# Patient Record
Sex: MALE | Race: WHITE | NOT HISPANIC OR LATINO | Employment: FULL TIME | URBAN - METROPOLITAN AREA
[De-identification: names, ages, dates, MRNs, and addresses within clinical notes are randomized per-mention and may not be internally consistent; named-entity substitution may affect disease eponyms.]

---

## 2020-06-19 ENCOUNTER — OFFICE VISIT (OUTPATIENT)
Dept: FAMILY MEDICINE CLINIC | Facility: CLINIC | Age: 55
End: 2020-06-19

## 2020-06-19 VITALS
HEIGHT: 70 IN | BODY MASS INDEX: 34.79 KG/M2 | SYSTOLIC BLOOD PRESSURE: 134 MMHG | HEART RATE: 66 BPM | TEMPERATURE: 97.8 F | WEIGHT: 243 LBS | DIASTOLIC BLOOD PRESSURE: 74 MMHG | OXYGEN SATURATION: 98 %

## 2020-06-19 DIAGNOSIS — Z12.5 SCREENING FOR PROSTATE CANCER: ICD-10-CM

## 2020-06-19 DIAGNOSIS — Z80.42 FAMILY HISTORY OF PROSTATE CANCER: ICD-10-CM

## 2020-06-19 DIAGNOSIS — S22.42XD CLOSED FRACTURE OF MULTIPLE RIBS OF LEFT SIDE WITH ROUTINE HEALING: ICD-10-CM

## 2020-06-19 DIAGNOSIS — E78.2 MIXED HYPERLIPIDEMIA: Primary | ICD-10-CM

## 2020-06-19 DIAGNOSIS — Z12.11 COLON CANCER SCREENING: ICD-10-CM

## 2020-06-19 PROCEDURE — 99214 OFFICE O/P EST MOD 30 MIN: CPT | Performed by: FAMILY MEDICINE

## 2020-06-19 RX ORDER — PROMETHAZINE HYDROCHLORIDE 25 MG/1
25 TABLET ORAL
COMMUNITY
Start: 2020-06-15

## 2020-06-19 RX ORDER — OXYCODONE AND ACETAMINOPHEN 10; 325 MG/1; MG/1
1 TABLET ORAL
COMMUNITY
Start: 2020-06-15

## 2020-06-19 RX ORDER — ATORVASTATIN CALCIUM 40 MG/1
20 TABLET, FILM COATED ORAL DAILY
COMMUNITY
End: 2020-06-19 | Stop reason: SDUPTHER

## 2020-06-19 RX ORDER — ONDANSETRON 4 MG/1
4 TABLET, FILM COATED ORAL
COMMUNITY
Start: 2020-06-15

## 2020-06-19 RX ORDER — ATORVASTATIN CALCIUM 20 MG/1
20 TABLET, FILM COATED ORAL DAILY
Qty: 90 TABLET | Refills: 3 | Status: SHIPPED | OUTPATIENT
Start: 2020-06-19

## 2020-06-19 NOTE — PROGRESS NOTES
"Subjective   Johnathan Olsen is a 55 y.o. male.     Chief Complaint   Patient presents with   • Rib Injury       History of Present Illness   Patient fell off a ladder a week ago.  Went to the ER and was told he had broken some ribs.  Is here to follow-up.  Also here to reestablish care.  Using incentive spirometer at home.  Pain is slowly improving.    hld- doing well on meds, due labs    FH prostate ca., no symptoms    Never had c-scope    The following portions of the patient's history were reviewed and updated as appropriate: allergies, current medications, past family history, past medical history, past social history, past surgical history and problem list.    History reviewed. No pertinent past medical history.    Past Surgical History:   Procedure Laterality Date   • TONSILLECTOMY         Family History   Problem Relation Age of Onset   • Other Mother         ms   • Heart disease Mother    • Hyperlipidemia Mother    • Cancer Father         lung, prostate       Social History     Socioeconomic History   • Marital status:      Spouse name: Not on file   • Number of children: Not on file   • Years of education: Not on file   • Highest education level: Not on file   Tobacco Use   • Smoking status: Former Smoker   • Smokeless tobacco: Never Used   Substance and Sexual Activity   • Alcohol use: Yes     Frequency: Monthly or less   • Drug use: Never       Review of Systems   Respiratory: Negative for shortness of breath.    Cardiovascular: Negative for chest pain.       Objective   Visit Vitals  /74 (BP Location: Right arm, Patient Position: Sitting)   Pulse 66   Temp 97.8 °F (36.6 °C)   Ht 177.8 cm (70\")   Wt 110 kg (243 lb)   SpO2 98%   BMI 34.87 kg/m²     Body mass index is 34.87 kg/m².  Physical Exam   Constitutional: He is oriented to person, place, and time. He appears well-developed and well-nourished.   Cardiovascular: Normal rate, regular rhythm, normal heart sounds and intact distal pulses. "   Pulmonary/Chest: Effort normal and breath sounds normal.   Musculoskeletal: Normal range of motion. He exhibits no edema.   Neurological: He is alert and oriented to person, place, and time.   Skin: Skin is warm and dry.   Psychiatric: He has a normal mood and affect. His behavior is normal.         Assessment/Plan   Johnathan was seen today for rib injury.    Diagnoses and all orders for this visit:    Mixed hyperlipidemia  -     atorvastatin (LIPITOR) 20 MG tablet; Take 1 tablet by mouth Daily.  -     Comprehensive Metabolic Panel  -     Lipid Panel  -     PSA Screen    Family history of prostate cancer  -     PSA Screen    Colon cancer screening  -     PSA Screen  -     Ambulatory Referral For Screening Colonoscopy    Closed fracture of multiple ribs of left side with routine healing  -     PSA Screen    Screening for prostate cancer  -     PSA Screen        F/U as needed and yearly. Cont meds.

## 2020-06-20 LAB
ALBUMIN SERPL-MCNC: 4.4 G/DL (ref 3.5–5.2)
ALBUMIN/GLOB SERPL: 1.7 G/DL
ALP SERPL-CCNC: 56 U/L (ref 39–117)
ALT SERPL-CCNC: 24 U/L (ref 1–41)
AST SERPL-CCNC: 18 U/L (ref 1–40)
BILIRUB SERPL-MCNC: 0.4 MG/DL (ref 0.2–1.2)
BUN SERPL-MCNC: 13 MG/DL (ref 6–20)
BUN/CREAT SERPL: 11.8 (ref 7–25)
CALCIUM SERPL-MCNC: 9.5 MG/DL (ref 8.6–10.5)
CHLORIDE SERPL-SCNC: 103 MMOL/L (ref 98–107)
CHOLEST SERPL-MCNC: 157 MG/DL (ref 0–200)
CO2 SERPL-SCNC: 24.9 MMOL/L (ref 22–29)
CREAT SERPL-MCNC: 1.1 MG/DL (ref 0.76–1.27)
GLOBULIN SER CALC-MCNC: 2.6 GM/DL
GLUCOSE SERPL-MCNC: 92 MG/DL (ref 65–99)
HDLC SERPL-MCNC: 39 MG/DL (ref 40–60)
LDLC SERPL CALC-MCNC: 95 MG/DL (ref 0–100)
POTASSIUM SERPL-SCNC: 4.7 MMOL/L (ref 3.5–5.2)
PROT SERPL-MCNC: 7 G/DL (ref 6–8.5)
PSA SERPL-MCNC: 0.27 NG/ML (ref 0–4)
SODIUM SERPL-SCNC: 140 MMOL/L (ref 136–145)
TRIGL SERPL-MCNC: 116 MG/DL (ref 0–150)
VLDLC SERPL CALC-MCNC: 23.2 MG/DL

## 2021-06-23 DIAGNOSIS — E78.2 MIXED HYPERLIPIDEMIA: ICD-10-CM

## 2021-06-23 RX ORDER — ATORVASTATIN CALCIUM 20 MG/1
TABLET, FILM COATED ORAL
Qty: 90 TABLET | Refills: 3 | OUTPATIENT
Start: 2021-06-23

## 2024-02-22 ENCOUNTER — OFFICE VISIT (OUTPATIENT)
Dept: FAMILY MEDICINE CLINIC | Facility: CLINIC | Age: 59
End: 2024-02-22
Payer: COMMERCIAL

## 2024-02-22 VITALS
RESPIRATION RATE: 17 BRPM | HEIGHT: 70 IN | SYSTOLIC BLOOD PRESSURE: 124 MMHG | WEIGHT: 258 LBS | HEART RATE: 60 BPM | OXYGEN SATURATION: 97 % | BODY MASS INDEX: 36.94 KG/M2 | TEMPERATURE: 97.8 F | DIASTOLIC BLOOD PRESSURE: 82 MMHG

## 2024-02-22 DIAGNOSIS — Z12.11 COLON CANCER SCREENING: ICD-10-CM

## 2024-02-22 DIAGNOSIS — E78.2 MIXED HYPERLIPIDEMIA: ICD-10-CM

## 2024-02-22 DIAGNOSIS — Z11.59 ENCOUNTER FOR HEPATITIS C SCREENING TEST FOR LOW RISK PATIENT: ICD-10-CM

## 2024-02-22 DIAGNOSIS — Z28.39 IMMUNIZATION DEFICIENCY: ICD-10-CM

## 2024-02-22 DIAGNOSIS — R35.1 BENIGN PROSTATIC HYPERPLASIA WITH NOCTURIA: ICD-10-CM

## 2024-02-22 DIAGNOSIS — Z00.00 ENCOUNTER FOR ANNUAL PHYSICAL EXAM: Primary | ICD-10-CM

## 2024-02-22 DIAGNOSIS — I20.89 EXERTIONAL ANGINA: ICD-10-CM

## 2024-02-22 DIAGNOSIS — N40.1 BENIGN PROSTATIC HYPERPLASIA WITH NOCTURIA: ICD-10-CM

## 2024-02-22 RX ORDER — ATORVASTATIN CALCIUM 40 MG/1
40 TABLET, FILM COATED ORAL DAILY
Qty: 90 TABLET | Refills: 3 | Status: SHIPPED | OUTPATIENT
Start: 2024-02-22

## 2024-02-22 RX ORDER — ASPIRIN 81 MG/1
162 TABLET ORAL DAILY
Start: 2024-02-22

## 2024-02-22 RX ORDER — ATORVASTATIN CALCIUM 20 MG/1
20 TABLET, FILM COATED ORAL DAILY
Qty: 90 TABLET | Refills: 3 | Status: SHIPPED | OUTPATIENT
Start: 2024-02-22 | End: 2024-02-22 | Stop reason: SDUPTHER

## 2024-02-22 RX ORDER — NITROGLYCERIN 0.4 MG/1
0.4 TABLET SUBLINGUAL
Qty: 25 TABLET | Refills: 12 | Status: SHIPPED | OUTPATIENT
Start: 2024-02-22

## 2024-02-22 NOTE — PROGRESS NOTES
Patient here for annual physical exam    Subjective   Johnathan Olsen is a 59 y.o. male.     History of Present Illness   60 yo WM here for annual.    The following portions of the patient's history were reviewed and updated as appropriate: allergies, current medications, past family history, past medical history, past social history, past surgical history and problem list    Cologuard needed.    Optometry:utd  Dentist: utd.    Last PSA(if applicable):needed.  Last mammo(if applicable):na    C/o chest discomfort.  C/o episode of pain in chest.  Was picking up sticks and discomfort in chest.  Another time thinks he was taking garbage out and had pain in chest and went up into jaw and cheeks.  Usually lasts 5-10 minutes then goes away.  No help with antacids.  Mom with angioplasty in 40s.  No exercise.  Past smoker quit 2009 and smoked 25 years.  Can sometimes walk to bathroom at work and will get chest pain.      Immunization History   Administered Date(s) Administered    COVID-19 (PFIZER) Purple Cap Monovalent 03/24/2021, 04/21/2021, 12/27/2021    Fluzone (or Fluarix & Flulaval for VFC) >6mos 12/27/2021    Tdap 02/22/2024       Review of Systems   Constitutional:  Negative for appetite change and fatigue.   HENT:  Negative for nosebleeds and sore throat.    Eyes:  Negative for blurred vision and visual disturbance.   Respiratory:  Negative for shortness of breath and wheezing.    Cardiovascular:  Positive for chest pain. Negative for leg swelling.   Gastrointestinal:  Negative for abdominal distention and abdominal pain.   Endocrine: Negative for cold intolerance and polyuria.   Genitourinary:  Negative for dysuria and hematuria.   Musculoskeletal:  Negative for arthralgias and myalgias.   Skin:  Negative for color change and rash.   Neurological:  Negative for weakness and confusion.   Psychiatric/Behavioral:  Negative for agitation and depressed mood.        Patient Active Problem List   Diagnosis    APC (atrial  premature contractions)    Hyperlipidemia    RBBB (right bundle branch block)    Family history of prostate cancer    Colon cancer screening    Closed fracture of multiple ribs of left side with routine healing    Screening for prostate cancer    Encounter for hepatitis C screening test for low risk patient    Encounter for annual physical exam    Benign prostatic hyperplasia with nocturia    Exertional angina       No Known Allergies      Current Outpatient Medications:     atorvastatin (LIPITOR) 40 MG tablet, Take 1 tablet by mouth Daily., Disp: 90 tablet, Rfl: 3    aspirin 81 MG EC tablet, Take 2 tablets by mouth Daily., Disp: , Rfl:     nitroglycerin (NITROSTAT) 0.4 MG SL tablet, Place 1 tablet under the tongue Every 5 (Five) Minutes As Needed for Chest Pain. Take no more than 3 doses in 15 minutes., Disp: 25 tablet, Rfl: 12    Past Medical History:   Diagnosis Date    Hyperlipidemia        Past Surgical History:   Procedure Laterality Date    TONSILLECTOMY         Family History   Problem Relation Age of Onset    Other Mother         ms    Heart disease Mother     Hyperlipidemia Mother     Cancer Father         lung, prostate       Social History     Tobacco Use    Smoking status: Former     Packs/day: 1.00     Years: 25.00     Additional pack years: 0.00     Total pack years: 25.00     Types: Cigarettes     Quit date: 2/11/2009     Years since quitting: 15.0    Smokeless tobacco: Never   Substance Use Topics    Alcohol use: Yes     Comment: socially            Objective     Vitals:    02/22/24 1247   BP: 124/82   Pulse: 60   Resp: 17   Temp: 97.8 °F (36.6 °C)   SpO2: 97%     Body mass index is 37.02 kg/m².    Physical Exam  Vitals reviewed.   Constitutional:       Appearance: He is well-developed. He is not diaphoretic.   HENT:      Head: Normocephalic and atraumatic.   Eyes:      General: No scleral icterus.     Pupils: Pupils are equal, round, and reactive to light.   Neck:      Thyroid: No thyromegaly.    Cardiovascular:      Rate and Rhythm: Normal rate and regular rhythm.      Heart sounds: No murmur heard.     No friction rub. No gallop.   Pulmonary:      Effort: Pulmonary effort is normal. No respiratory distress.      Breath sounds: No wheezing or rales.   Chest:      Chest wall: No tenderness.   Abdominal:      General: Bowel sounds are normal. There is no distension.      Palpations: Abdomen is soft.      Tenderness: There is no abdominal tenderness.   Musculoskeletal:         General: No deformity. Normal range of motion.   Lymphadenopathy:      Cervical: No cervical adenopathy.   Skin:     General: Skin is warm and dry.      Findings: No rash.   Neurological:      Cranial Nerves: No cranial nerve deficit.      Motor: No abnormal muscle tone.         Lab Results   Component Value Date    GLUCOSE 92 06/19/2020    BUN 13 06/19/2020    CREATININE 1.10 06/19/2020    EGFRIFNONA 69 06/19/2020    EGFRIFAFRI 84 06/19/2020    BCR 11.8 06/19/2020    K 4.7 06/19/2020    CO2 24.9 06/19/2020    CALCIUM 9.5 06/19/2020    PROTENTOTREF 7.0 06/19/2020    ALBUMIN 4.40 06/19/2020    LABIL2 1.7 06/19/2020    AST 18 06/19/2020    ALT 24 06/19/2020       WBC   Date Value Ref Range Status   06/15/2020 7.70 4.5 - 11.0 10*3/uL Final     RBC   Date Value Ref Range Status   06/15/2020 5.00 4.5 - 5.9 10*6/uL Final     Hemoglobin   Date Value Ref Range Status   06/15/2020 14.7 13.5 - 17.5 g/dL Final     Hematocrit   Date Value Ref Range Status   06/15/2020 44.9 41.0 - 53.0 % Final     MCV   Date Value Ref Range Status   06/15/2020 89.8 80.0 - 100.0 fL Final     MCH   Date Value Ref Range Status   06/15/2020 29.4 26.0 - 34.0 pg Final     MCHC   Date Value Ref Range Status   06/15/2020 32.7 31.0 - 37.0 g/dL Final     RDW   Date Value Ref Range Status   06/15/2020 15.5 12.0 - 16.8 % Final     MPV   Date Value Ref Range Status   06/15/2020 10.6 6.7 - 10.8 fL Final     Platelets   Date Value Ref Range Status   06/15/2020 186 140 - 440  "10*3/uL Final     Neutrophil Rel %   Date Value Ref Range Status   06/15/2020 64.5 45 - 80 % Final     Lymphocyte Rel %   Date Value Ref Range Status   06/15/2020 21.2 15 - 50 % Final     Monocyte Rel %   Date Value Ref Range Status   06/15/2020 10.1 0 - 15 % Final     Eosinophil %   Date Value Ref Range Status   06/15/2020 3.4 0 - 7 % Final     Basophil Rel %   Date Value Ref Range Status   06/15/2020 0.5 0 - 2 % Final     Immature Grans %   Date Value Ref Range Status   06/15/2020 0.3 (H) 0 % Final     Neutrophils Absolute   Date Value Ref Range Status   06/15/2020 4.97 2.0 - 8.8 10*3/uL Final     Lymphocytes Absolute   Date Value Ref Range Status   06/15/2020 1.63 0.7 - 5.5 10*3/uL Final     Monocytes Absolute   Date Value Ref Range Status   06/15/2020 0.78 0.0 - 1.7 10*3/uL Final     Eosinophils Absolute   Date Value Ref Range Status   06/15/2020 0.26 0.0 - 0.8 10*3/uL Final     Basophils Absolute   Date Value Ref Range Status   06/15/2020 0.04 0.0 - 0.2 10*3/uL Final     Immature Grans, Absolute   Date Value Ref Range Status   06/15/2020 0.02 <1 10*3/uL Final     nRBC   Date Value Ref Range Status   06/15/2020 0 0 /100(WBC) Final       No results found for: \"HGBA1C\"    No results found for: \"REDASTXI86\"    No results found for: \"TSH\"    No results found for: \"CHOL\"  Lab Results   Component Value Date    TRIG 116 06/19/2020     Lab Results   Component Value Date    HDL 39 (L) 06/19/2020     Lab Results   Component Value Date    LDL 95 06/19/2020     Lab Results   Component Value Date    VLDL 23.2 06/19/2020     No results found for: \"LDLHDL\"      Procedures  EKG with NSR no ST changes no q waves.  No comparisons.  EKG performed for Chest pain.      Assessment & Plan   Problems Addressed this Visit       Hyperlipidemia    Relevant Medications    atorvastatin (LIPITOR) 40 MG tablet    Other Relevant Orders    Comprehensive Metabolic Panel    Lipid Panel With / Chol / HDL Ratio    Colon cancer screening    Relevant " Orders    Cologuard - Stool, Per Rectum    Encounter for hepatitis C screening test for low risk patient    Relevant Orders    Hepatitis C Antibody    Encounter for annual physical exam - Primary    Benign prostatic hyperplasia with nocturia    Relevant Orders    PSA DIAGNOSTIC    Exertional angina    Relevant Medications    nitroglycerin (NITROSTAT) 0.4 MG SL tablet    Other Relevant Orders    CBC & Differential    ECG 12 Lead    Ambulatory Referral to Cardiology     Other Visit Diagnoses       Immunization deficiency        Relevant Orders    Tdap Vaccine Greater Than or Equal To 8yo IM (Completed)          Diagnoses         Codes Comments    Encounter for annual physical exam    -  Primary ICD-10-CM: Z00.00  ICD-9-CM: V70.0     Colon cancer screening     ICD-10-CM: Z12.11  ICD-9-CM: V76.51     Benign prostatic hyperplasia with nocturia     ICD-10-CM: N40.1, R35.1  ICD-9-CM: 600.01, 788.43     Mixed hyperlipidemia     ICD-10-CM: E78.2  ICD-9-CM: 272.2     Exertional angina     ICD-10-CM: I20.89  ICD-9-CM: 413.9     Encounter for hepatitis C screening test for low risk patient     ICD-10-CM: Z11.59  ICD-9-CM: V73.89     Immunization deficiency     ICD-10-CM: Z28.39  ICD-9-CM: V15.83         Exertional angina.  Multiple risk factors for CAD(past smoker, FH, obesity, hyperlipidmeia).  Stay on aspirin  but increase to 162 mg daily.  Increase atorvastatin to 40 a day. Start nitro.  Stat cards appt as will need cath.       Preventive Counseling:  Encouraged to stay active.   Pneumovax UTD.  Cologuard ordered. Tdap today.    Dentist UTD.  Optho UTD.      Orders Placed This Encounter   Procedures    Tdap Vaccine Greater Than or Equal To 8yo IM    Cologuard - Stool, Per Rectum     Standing Status:   Future     Standing Expiration Date:   2/22/2025     Order Specific Question:   Release to patient     Answer:   Routine Release [[id number]]    Comprehensive Metabolic Panel     Order Specific Question:   Release to patient      Answer:   Routine Release [[id number]]    Lipid Panel With / Chol / HDL Ratio     Order Specific Question:   Release to patient     Answer:   Routine Release [[id number]]    Hepatitis C Antibody     Order Specific Question:   Release to patient     Answer:   Routine Release [[id number]]    PSA DIAGNOSTIC     Order Specific Question:   Release to patient     Answer:   Routine Release [[id number]]    Ambulatory Referral to Cardiology     Referral Priority:   Urgent     Referral Type:   Consultation     Referral Reason:   Specialty Services Required     Requested Specialty:   Cardiology     Number of Visits Requested:   1    ECG 12 Lead     Order Specific Question:   Reason for Exam:     Answer:   chest pain     Order Specific Question:   Release to patient     Answer:   Routine Release [[id number]]    SCANNED EKG    CBC & Differential     Order Specific Question:   Manual Differential     Answer:   No     Order Specific Question:   Release to patient     Answer:   Routine Release [[id number]]       Current Outpatient Medications   Medication Sig Dispense Refill    atorvastatin (LIPITOR) 40 MG tablet Take 1 tablet by mouth Daily. 90 tablet 3    aspirin 81 MG EC tablet Take 2 tablets by mouth Daily.      nitroglycerin (NITROSTAT) 0.4 MG SL tablet Place 1 tablet under the tongue Every 5 (Five) Minutes As Needed for Chest Pain. Take no more than 3 doses in 15 minutes. 25 tablet 12     No current facility-administered medications for this visit.       Return in about 6 weeks (around 4/4/2024).    There are no Patient Instructions on file for this visit.

## 2024-02-23 ENCOUNTER — OFFICE VISIT (OUTPATIENT)
Age: 59
End: 2024-02-23
Payer: COMMERCIAL

## 2024-02-23 VITALS
WEIGHT: 255.2 LBS | SYSTOLIC BLOOD PRESSURE: 138 MMHG | OXYGEN SATURATION: 92 % | BODY MASS INDEX: 36.54 KG/M2 | DIASTOLIC BLOOD PRESSURE: 80 MMHG | HEART RATE: 71 BPM | HEIGHT: 70 IN

## 2024-02-23 DIAGNOSIS — E78.2 MIXED HYPERLIPIDEMIA: ICD-10-CM

## 2024-02-23 DIAGNOSIS — I49.1 APC (ATRIAL PREMATURE CONTRACTIONS): ICD-10-CM

## 2024-02-23 DIAGNOSIS — I45.10 RBBB (RIGHT BUNDLE BRANCH BLOCK): ICD-10-CM

## 2024-02-23 DIAGNOSIS — R07.9 CHEST PAIN, UNSPECIFIED TYPE: Primary | ICD-10-CM

## 2024-02-23 LAB
ALBUMIN SERPL-MCNC: 4.5 G/DL (ref 3.8–4.9)
ALBUMIN/GLOB SERPL: 1.7 {RATIO} (ref 1.2–2.2)
ALP SERPL-CCNC: 71 IU/L (ref 44–121)
ALT SERPL-CCNC: 60 IU/L (ref 0–44)
AST SERPL-CCNC: 32 IU/L (ref 0–40)
BASOPHILS # BLD AUTO: 0.1 X10E3/UL (ref 0–0.2)
BASOPHILS NFR BLD AUTO: 1 %
BILIRUB SERPL-MCNC: 0.6 MG/DL (ref 0–1.2)
BUN SERPL-MCNC: 13 MG/DL (ref 6–24)
BUN/CREAT SERPL: 11 (ref 9–20)
CALCIUM SERPL-MCNC: 9.6 MG/DL (ref 8.7–10.2)
CHLORIDE SERPL-SCNC: 103 MMOL/L (ref 96–106)
CHOLEST SERPL-MCNC: 155 MG/DL (ref 100–199)
CHOLEST/HDLC SERPL: 4.3 RATIO (ref 0–5)
CO2 SERPL-SCNC: 23 MMOL/L (ref 20–29)
CREAT SERPL-MCNC: 1.2 MG/DL (ref 0.76–1.27)
EGFRCR SERPLBLD CKD-EPI 2021: 70 ML/MIN/1.73
EOSINOPHIL # BLD AUTO: 0.5 X10E3/UL (ref 0–0.4)
EOSINOPHIL NFR BLD AUTO: 5 %
ERYTHROCYTE [DISTWIDTH] IN BLOOD BY AUTOMATED COUNT: 12.9 % (ref 11.6–15.4)
GLOBULIN SER CALC-MCNC: 2.6 G/DL (ref 1.5–4.5)
GLUCOSE SERPL-MCNC: 79 MG/DL (ref 70–99)
HCT VFR BLD AUTO: 49.8 % (ref 37.5–51)
HCV IGG SERPL QL IA: NON REACTIVE
HDLC SERPL-MCNC: 36 MG/DL
HGB BLD-MCNC: 16.9 G/DL (ref 13–17.7)
IMM GRANULOCYTES # BLD AUTO: 0 X10E3/UL (ref 0–0.1)
IMM GRANULOCYTES NFR BLD AUTO: 0 %
LDLC SERPL CALC-MCNC: 93 MG/DL (ref 0–99)
LYMPHOCYTES # BLD AUTO: 2.6 X10E3/UL (ref 0.7–3.1)
LYMPHOCYTES NFR BLD AUTO: 29 %
MCH RBC QN AUTO: 30.2 PG (ref 26.6–33)
MCHC RBC AUTO-ENTMCNC: 33.9 G/DL (ref 31.5–35.7)
MCV RBC AUTO: 89 FL (ref 79–97)
MONOCYTES # BLD AUTO: 0.8 X10E3/UL (ref 0.1–0.9)
MONOCYTES NFR BLD AUTO: 9 %
NEUTROPHILS # BLD AUTO: 5 X10E3/UL (ref 1.4–7)
NEUTROPHILS NFR BLD AUTO: 56 %
PLATELET # BLD AUTO: 220 X10E3/UL (ref 150–450)
POTASSIUM SERPL-SCNC: 4.7 MMOL/L (ref 3.5–5.2)
PROT SERPL-MCNC: 7.1 G/DL (ref 6–8.5)
PSA SERPL-MCNC: 0.4 NG/ML (ref 0–4)
RBC # BLD AUTO: 5.6 X10E6/UL (ref 4.14–5.8)
SODIUM SERPL-SCNC: 142 MMOL/L (ref 134–144)
TRIGL SERPL-MCNC: 146 MG/DL (ref 0–149)
VLDLC SERPL CALC-MCNC: 26 MG/DL (ref 5–40)
WBC # BLD AUTO: 9 X10E3/UL (ref 3.4–10.8)

## 2024-02-23 PROCEDURE — 99204 OFFICE O/P NEW MOD 45 MIN: CPT | Performed by: INTERNAL MEDICINE

## 2024-02-23 PROCEDURE — 93000 ELECTROCARDIOGRAM COMPLETE: CPT | Performed by: INTERNAL MEDICINE

## 2024-02-23 NOTE — PROGRESS NOTES
Subjective:     Encounter Date:02/23/2024      Patient ID: Johnathan Olsen is a 59 y.o. male.    Chief Complaint:  History of Present Illness    This is a 59-year-old with hyperlipidemia who presents for evaluation of chest pain.    The patient reports that he began noticing episodes of chest discomfort about 2 months ago.  He reports for the most part the discomfort is limited to the middle of his chest.  It usually occurs with activity.  It does not occur every time he is active but sometimes it can take as little as walking from the desk in his office to the door.  He also reports on occasion he has had episodes of discomfort when he is gone into bed.  He does not notice the symptoms when he is getting ready for bed but only after he climbs into the bed.  He reports that he has had a couple of significant episodes in the last 2 months.  With both episodes the symptoms were more severe in his chest and radiated up to his neck and jaw.  Both his of symptoms involves activity where he was exerting himself more than normal.  The symptoms are associated with shortness of breath.  He denies any associated nausea or diaphoresis.  His symptoms generally resolve when he stops and rest.  He reports a couple of brief episodes of palpitations.  He denies any lower extremity swelling, near-syncope or syncope.  He admits that he does not exercise on a regular basis.    He reports that he underwent an echocardiogram and a Holter monitor several years ago for palpitations.  It appears that this was done with Dr. Taylor back in 2014.  His echocardiogram was unremarkable.  His Holter monitor showed frequent supraventricular ectopy was otherwise unremarkable.  Of note he was noted to have a right bundle branch block on his EKG at that time also.    He does report a family history of coronary artery disease in his mother who underwent PCI at the age of 40.    Review of Systems   Constitutional: Negative for malaise/fatigue.   HENT:   Negative for hearing loss, hoarse voice, nosebleeds and sore throat.    Eyes:  Negative for pain.   Cardiovascular:  Positive for chest pain, dyspnea on exertion and palpitations. Negative for claudication, cyanosis, irregular heartbeat, leg swelling, near-syncope, orthopnea, paroxysmal nocturnal dyspnea and syncope.   Respiratory:  Negative for shortness of breath and snoring.    Endocrine: Negative for cold intolerance, heat intolerance, polydipsia, polyphagia and polyuria.   Skin:  Negative for itching and rash.   Musculoskeletal:  Negative for arthritis, falls, joint pain, joint swelling, muscle cramps, muscle weakness and myalgias.   Gastrointestinal:  Negative for constipation, diarrhea, dysphagia, heartburn, hematemesis, hematochezia, melena, nausea and vomiting.   Genitourinary:  Negative for frequency, hematuria and hesitancy.   Neurological:  Negative for excessive daytime sleepiness, dizziness, headaches, light-headedness, numbness and weakness.   Psychiatric/Behavioral:  Negative for depression. The patient is not nervous/anxious.          Current Outpatient Medications:     aspirin 81 MG EC tablet, Take 2 tablets by mouth Daily., Disp: , Rfl:     atorvastatin (LIPITOR) 40 MG tablet, Take 1 tablet by mouth Daily., Disp: 90 tablet, Rfl: 3    nitroglycerin (NITROSTAT) 0.4 MG SL tablet, Place 1 tablet under the tongue Every 5 (Five) Minutes As Needed for Chest Pain. Take no more than 3 doses in 15 minutes., Disp: 25 tablet, Rfl: 12    Past Medical History:   Diagnosis Date    Hyperlipidemia        Past Surgical History:   Procedure Laterality Date    TONSILLECTOMY         Family History   Problem Relation Age of Onset    Other Mother         ms    Heart disease Mother     Hyperlipidemia Mother     Cancer Father         lung, prostate       Social History     Tobacco Use    Smoking status: Former     Packs/day: 1.00     Years: 25.00     Additional pack years: 0.00     Total pack years: 25.00     Types:  "Cigarettes     Quit date: 2/11/2009     Years since quitting: 15.0    Smokeless tobacco: Never   Vaping Use    Vaping Use: Never used   Substance Use Topics    Alcohol use: Yes     Comment: socially    Drug use: Never         ECG 12 Lead    Date/Time: 2/23/2024 4:04 PM  Performed by: Tracy Dixon MD    Authorized by: Tracy Dixon MD  Comparison: compared with previous ECG   Similar to previous ECG  Conduction: incomplete right bundle branch block             Objective:     Visit Vitals  /80 (BP Location: Left arm, Patient Position: Sitting, Cuff Size: Adult)   Pulse 71   Ht 177.8 cm (70\")   Wt 116 kg (255 lb 3.2 oz)   SpO2 92%   BMI 36.62 kg/m²         Constitutional:       Appearance: Normal appearance. Well-developed.   HENT:      Head: Normocephalic and atraumatic.   Neck:      Vascular: No carotid bruit or JVD.   Pulmonary:      Effort: Pulmonary effort is normal.      Breath sounds: Normal breath sounds.   Cardiovascular:      Normal rate. Regular rhythm.      No gallop.    Pulses:     Radial: 2+ bilaterally.  Edema:     Peripheral edema absent.   Abdominal:      Palpations: Abdomen is soft.   Skin:     General: Skin is warm and dry.   Neurological:      Mental Status: Alert and oriented to person, place, and time.             Assessment:          Diagnosis Plan   1. Chest pain, unspecified type        2. Mixed hyperlipidemia        3. APC (atrial premature contractions)        4. RBBB (right bundle branch block)               Plan:       1.  Chest pain.  Mild exertional symptoms that resolved with rest.  His EKG shows no acute changes.  His symptoms are certainly concerning for angina.  He is not currently on any antianginal medications.  He is already on aspirin and atorvastatin.  I have asked him to continue the aspirin and atorvastatin.  Will start him on metoprolol tartrate 25 mg twice a day day to see if this helps with his symptoms.  I discussed options further workup at length with the " patient and his wife including proceeding with directly with cardiac catheterization versus starting with a noninvasive approach while we see how he does with the medications.  Discussed both options at length including the procedures, risk and benefits.  At this time the patient and his wife would like to start with a noninvasive approach first.  Will proceed with a stress test and an echocardiogram.  2.  Hyperlipidemia.  On atorvastatin which is managed by Dr. Stratton.  3.  History of premature atrial contractions.  None present at this time.  4.  Incomplete right bundle branch block.  This appears to be a chronic issue.    Will call and discuss results of his stress test and his echocardiogram and determine further recommendations based on those results.

## 2024-03-04 ENCOUNTER — TELEPHONE (OUTPATIENT)
Dept: CARDIOLOGY | Facility: CLINIC | Age: 59
End: 2024-03-04
Payer: COMMERCIAL

## 2024-03-05 ENCOUNTER — HOSPITAL ENCOUNTER (OUTPATIENT)
Dept: CARDIOLOGY | Facility: HOSPITAL | Age: 59
Discharge: HOME | End: 2024-03-05
Payer: COMMERCIAL

## 2024-03-05 VITALS — BODY MASS INDEX: 36.61 KG/M2 | WEIGHT: 255.73 LBS | HEIGHT: 70 IN

## 2024-03-05 VITALS
WEIGHT: 255 LBS | OXYGEN SATURATION: 97 % | SYSTOLIC BLOOD PRESSURE: 131 MMHG | BODY MASS INDEX: 36.51 KG/M2 | HEIGHT: 70 IN | DIASTOLIC BLOOD PRESSURE: 84 MMHG | HEART RATE: 84 BPM

## 2024-03-05 DIAGNOSIS — R07.9 CHEST PAIN, UNSPECIFIED TYPE: ICD-10-CM

## 2024-03-05 LAB
AORTIC ARCH: 2.8 CM
AORTIC DIMENSIONLESS INDEX: 0.8 (DI)
ASCENDING AORTA: 2.8 CM
BH CV ECHO MEAS - ACS: 2.11 CM
BH CV ECHO MEAS - AO MAX PG: 4.5 MMHG
BH CV ECHO MEAS - AO MEAN PG: 3 MMHG
BH CV ECHO MEAS - AO ROOT DIAM: 3.4 CM
BH CV ECHO MEAS - AO V2 MAX: 106 CM/SEC
BH CV ECHO MEAS - AO V2 VTI: 21.1 CM
BH CV ECHO MEAS - AVA(I,D): 2.44 CM2
BH CV ECHO MEAS - EDV(CUBED): 91.1 ML
BH CV ECHO MEAS - EDV(MOD-SP2): 137 ML
BH CV ECHO MEAS - EDV(MOD-SP4): 138 ML
BH CV ECHO MEAS - EF(MOD-BP): 63.4 %
BH CV ECHO MEAS - EF(MOD-SP2): 62 %
BH CV ECHO MEAS - EF(MOD-SP4): 62.3 %
BH CV ECHO MEAS - ESV(CUBED): 25.5 ML
BH CV ECHO MEAS - ESV(MOD-SP2): 52 ML
BH CV ECHO MEAS - ESV(MOD-SP4): 52 ML
BH CV ECHO MEAS - FS: 34.6 %
BH CV ECHO MEAS - IVS/LVPW: 1.2 CM
BH CV ECHO MEAS - IVSD: 1.2 CM
BH CV ECHO MEAS - LAT PEAK E' VEL: 11.9 CM/SEC
BH CV ECHO MEAS - LV DIASTOLIC VOL/BSA (35-75): 59.6 CM2
BH CV ECHO MEAS - LV MASS(C)D: 175 GRAMS
BH CV ECHO MEAS - LV MAX PG: 3.6 MMHG
BH CV ECHO MEAS - LV MEAN PG: 2 MMHG
BH CV ECHO MEAS - LV SYSTOLIC VOL/BSA (12-30): 22.5 CM2
BH CV ECHO MEAS - LV V1 MAX: 94.7 CM/SEC
BH CV ECHO MEAS - LV V1 VTI: 15.9 CM
BH CV ECHO MEAS - LVIDD: 4.5 CM
BH CV ECHO MEAS - LVIDS: 2.9 CM
BH CV ECHO MEAS - LVOT AREA: 3.2 CM2
BH CV ECHO MEAS - LVOT DIAM: 2.03 CM
BH CV ECHO MEAS - LVPWD: 1 CM
BH CV ECHO MEAS - MED PEAK E' VEL: 9.7 CM/SEC
BH CV ECHO MEAS - MR MAX PG: 31.8 MMHG
BH CV ECHO MEAS - MR MAX VEL: 282 CM/SEC
BH CV ECHO MEAS - MV A DUR: 0.1 SEC
BH CV ECHO MEAS - MV A MAX VEL: 74.6 CM/SEC
BH CV ECHO MEAS - MV DEC SLOPE: 700.8 CM/SEC2
BH CV ECHO MEAS - MV DEC TIME: 0.08 SEC
BH CV ECHO MEAS - MV E MAX VEL: 78.8 CM/SEC
BH CV ECHO MEAS - MV E/A: 1.06
BH CV ECHO MEAS - MV MAX PG: 3.5 MMHG
BH CV ECHO MEAS - MV MEAN PG: 1.21 MMHG
BH CV ECHO MEAS - MV P1/2T: 40.9 MSEC
BH CV ECHO MEAS - MV V2 VTI: 21.4 CM
BH CV ECHO MEAS - MVA(P1/2T): 5.4 CM2
BH CV ECHO MEAS - MVA(VTI): 2.41 CM2
BH CV ECHO MEAS - PA ACC TIME: 0.1 SEC
BH CV ECHO MEAS - PA V2 MAX: 90.9 CM/SEC
BH CV ECHO MEAS - PULM A REVS DUR: 0.11 SEC
BH CV ECHO MEAS - PULM A REVS VEL: 33.8 CM/SEC
BH CV ECHO MEAS - PULM DIAS VEL: 79.5 CM/SEC
BH CV ECHO MEAS - PULM S/D: 0.68
BH CV ECHO MEAS - PULM SYS VEL: 54.3 CM/SEC
BH CV ECHO MEAS - QP/QS: 0.75
BH CV ECHO MEAS - RAP SYSTOLE: 3 MMHG
BH CV ECHO MEAS - RV MAX PG: 1.42 MMHG
BH CV ECHO MEAS - RV V1 MAX: 59.6 CM/SEC
BH CV ECHO MEAS - RV V1 VTI: 12.2 CM
BH CV ECHO MEAS - RVOT DIAM: 2 CM
BH CV ECHO MEAS - RVSP: 24 MMHG
BH CV ECHO MEAS - SI(MOD-SP2): 36.7 ML/M2
BH CV ECHO MEAS - SI(MOD-SP4): 37.2 ML/M2
BH CV ECHO MEAS - SUP REN AO DIAM: 2.4 CM
BH CV ECHO MEAS - SV(LVOT): 51.5 ML
BH CV ECHO MEAS - SV(MOD-SP2): 85 ML
BH CV ECHO MEAS - SV(MOD-SP4): 86 ML
BH CV ECHO MEAS - SV(RVOT): 38.5 ML
BH CV ECHO MEAS - TAPSE (>1.6): 2.17 CM
BH CV ECHO MEAS - TR MAX PG: 21.3 MMHG
BH CV ECHO MEAS - TR MAX VEL: 230.8 CM/SEC
BH CV ECHO MEASUREMENTS AVERAGE E/E' RATIO: 7.3
BH CV ECHO SHUNT ASSESSMENT PERFORMED (HIDDEN SCRIPTING): 1
BH CV NUCLEAR PRIOR STUDY: 2
BH CV REST NUCLEAR ISOTOPE DOSE: 10.5 MCI
BH CV STRESS BP STAGE 1: NORMAL
BH CV STRESS BP STAGE 2: NORMAL
BH CV STRESS DURATION MIN STAGE 1: 3
BH CV STRESS DURATION MIN STAGE 2: 3
BH CV STRESS DURATION MIN STAGE 3: 1
BH CV STRESS DURATION SEC STAGE 1: 0
BH CV STRESS DURATION SEC STAGE 2: 0
BH CV STRESS DURATION SEC STAGE 3: 0
BH CV STRESS GRADE STAGE 1: 10
BH CV STRESS GRADE STAGE 2: 12
BH CV STRESS GRADE STAGE 3: 14
BH CV STRESS HR STAGE 1: 117
BH CV STRESS HR STAGE 2: 141
BH CV STRESS HR STAGE 3: 150
BH CV STRESS METS STAGE 1: 5
BH CV STRESS METS STAGE 2: 7.5
BH CV STRESS METS STAGE 3: 8
BH CV STRESS NUCLEAR ISOTOPE DOSE: 35.6 MCI
BH CV STRESS PROTOCOL 1: NORMAL
BH CV STRESS RECOVERY BP: NORMAL MMHG
BH CV STRESS RECOVERY HR: 101 BPM
BH CV STRESS SPEED STAGE 1: 1.7
BH CV STRESS SPEED STAGE 2: 2.5
BH CV STRESS SPEED STAGE 3: 3.4
BH CV STRESS STAGE 1: 1
BH CV STRESS STAGE 2: 2
BH CV STRESS STAGE 3: 3
BH CV XLRA - RV BASE: 3 CM
BH CV XLRA - RV LENGTH: 8.3 CM
BH CV XLRA - RV MID: 2.44 CM
BH CV XLRA - TDI S': 13.7 CM/SEC
LEFT ATRIUM VOLUME INDEX: 31.3 ML/M2
LV EF NUC BP: 58 %
MAXIMAL PREDICTED HEART RATE: 161 BPM
PERCENT MAX PREDICTED HR: 93.17 %
SINUS: 2.9 CM
STJ: 2.5 CM
STRESS BASELINE BP: NORMAL MMHG
STRESS BASELINE HR: 76 BPM
STRESS PERCENT HR: 110 %
STRESS POST ESTIMATED WORKLOAD: 8 METS
STRESS POST EXERCISE DUR MIN: 7 MIN
STRESS POST EXERCISE DUR SEC: 0 SEC
STRESS POST PEAK BP: NORMAL MMHG
STRESS POST PEAK HR: 150 BPM
STRESS TARGET HR: 137 BPM

## 2024-03-05 PROCEDURE — 93016 CV STRESS TEST SUPVJ ONLY: CPT | Performed by: INTERNAL MEDICINE

## 2024-03-05 PROCEDURE — 0 TECHNETIUM TETROFOSMIN KIT: Performed by: INTERNAL MEDICINE

## 2024-03-05 PROCEDURE — 93017 CV STRESS TEST TRACING ONLY: CPT

## 2024-03-05 PROCEDURE — 93306 TTE W/DOPPLER COMPLETE: CPT | Performed by: INTERNAL MEDICINE

## 2024-03-05 PROCEDURE — A9502 TC99M TETROFOSMIN: HCPCS | Performed by: INTERNAL MEDICINE

## 2024-03-05 PROCEDURE — 78452 HT MUSCLE IMAGE SPECT MULT: CPT

## 2024-03-05 PROCEDURE — 93306 TTE W/DOPPLER COMPLETE: CPT

## 2024-03-05 PROCEDURE — 93018 CV STRESS TEST I&R ONLY: CPT | Performed by: INTERNAL MEDICINE

## 2024-03-05 PROCEDURE — 78452 HT MUSCLE IMAGE SPECT MULT: CPT | Performed by: INTERNAL MEDICINE

## 2024-03-05 PROCEDURE — 25510000001 PERFLUTREN (DEFINITY) 8.476 MG IN SODIUM CHLORIDE (PF) 0.9 % 10 ML INJECTION: Performed by: INTERNAL MEDICINE

## 2024-03-05 RX ADMIN — TETROFOSMIN 1 DOSE: 1.38 INJECTION, POWDER, LYOPHILIZED, FOR SOLUTION INTRAVENOUS at 08:39

## 2024-03-05 RX ADMIN — TETROFOSMIN 1 DOSE: 1.38 INJECTION, POWDER, LYOPHILIZED, FOR SOLUTION INTRAVENOUS at 07:46

## 2024-03-05 RX ADMIN — PERFLUTREN 2 ML: 6.52 INJECTION, SUSPENSION INTRAVENOUS at 07:44

## 2024-03-06 NOTE — PROGRESS NOTES
Called and left a message with normal stress test results and unremarkable echocardiogram results.  Asked the patient to give me a call back so we can discuss results and so I can get an update on his symptoms after the initiation of beta-blockers.

## 2024-03-15 ENCOUNTER — TELEPHONE (OUTPATIENT)
Age: 59
End: 2024-03-15
Payer: COMMERCIAL

## 2024-03-15 RX ORDER — ISOSORBIDE MONONITRATE 30 MG/1
30 TABLET, EXTENDED RELEASE ORAL DAILY
Qty: 30 TABLET | Refills: 5 | Status: SHIPPED | OUTPATIENT
Start: 2024-03-15

## 2024-03-15 NOTE — TELEPHONE ENCOUNTER
Called patient again to go over the results of his stress test and his echocardiogram.  Explained that his echocardiogram is unremarkable and stress test is normal.  He reports that his tightness is better with the beta-blocker although he reports some dull ache discomfort different from what he was experiencing before that has been more constant and improves with position changes.  He continues to have some tightness in his neck with exertion however.  We discussed options again of continuing medical management and close follow-up versus proceeding with cardiac catheterization.  He would like to hold off on cardiac catheterization a little bit longer.    Recommended starting isosorbide mononitrate 30 mg daily and a follow-up in a month.  Asked him to notify us if he has any worsening symptoms or any issues with the addition of this medication.    Please see that this patient is scheduled for a 1 month follow-up with myself or Fozia.  Thanks

## 2024-04-11 ENCOUNTER — OFFICE VISIT (OUTPATIENT)
Dept: FAMILY MEDICINE CLINIC | Facility: CLINIC | Age: 59
End: 2024-04-11
Payer: COMMERCIAL

## 2024-04-11 VITALS
HEART RATE: 72 BPM | SYSTOLIC BLOOD PRESSURE: 130 MMHG | RESPIRATION RATE: 17 BRPM | HEIGHT: 70 IN | WEIGHT: 257 LBS | TEMPERATURE: 97.5 F | OXYGEN SATURATION: 96 % | BODY MASS INDEX: 36.79 KG/M2 | DIASTOLIC BLOOD PRESSURE: 72 MMHG

## 2024-04-11 DIAGNOSIS — I20.89 EXERTIONAL ANGINA: Primary | ICD-10-CM

## 2024-04-11 DIAGNOSIS — E78.2 MIXED HYPERLIPIDEMIA: ICD-10-CM

## 2024-04-11 PROCEDURE — 99214 OFFICE O/P EST MOD 30 MIN: CPT | Performed by: FAMILY MEDICINE

## 2024-04-11 RX ORDER — ISOSORBIDE MONONITRATE 30 MG/1
30 TABLET, EXTENDED RELEASE ORAL DAILY
Qty: 90 TABLET | Refills: 5 | Status: SHIPPED | OUTPATIENT
Start: 2024-04-11

## 2024-04-11 NOTE — PROGRESS NOTES
Chief Complaint   Patient presents with    Heart Problem     Cardiology follow up     Hyperlipidemia       Subjective   Johnathan Olsen is a 59 y.o. male.     History of Present Illness   F/U Chest pain.  Stress cardiolyte normal.  Echo normal. Pain in neck on occasion still with mowing grass but not as severe.  FU hyperlipidmeia.  Increased atorvastatin 1 month ago.  No Se.      The following portions of the patient's history were reviewed and updated as appropriate: allergies, current medications, past family history, past medical history, past social history, past surgical history and problem list.    Review of Systems   Constitutional:  Negative for appetite change and fatigue.   HENT:  Negative for nosebleeds and sore throat.    Eyes:  Negative for blurred vision and visual disturbance.   Respiratory:  Negative for shortness of breath and wheezing.    Cardiovascular:  Negative for chest pain and leg swelling.   Gastrointestinal:  Negative for abdominal distention and abdominal pain.   Endocrine: Negative for cold intolerance and polyuria.   Genitourinary:  Negative for dysuria and hematuria.   Musculoskeletal:  Negative for arthralgias and myalgias.   Skin:  Negative for color change and rash.   Neurological:  Negative for weakness and confusion.   Psychiatric/Behavioral:  Negative for agitation and depressed mood.        Patient Active Problem List   Diagnosis    APC (atrial premature contractions)    Hyperlipidemia    RBBB (right bundle branch block)    Family history of prostate cancer    Colon cancer screening    Closed fracture of multiple ribs of left side with routine healing    Screening for prostate cancer    Encounter for hepatitis C screening test for low risk patient    Encounter for annual physical exam    Benign prostatic hyperplasia with nocturia    Exertional angina       No Known Allergies      Current Outpatient Medications:     aspirin 81 MG EC tablet, Take 2 tablets by mouth Daily., Disp: ,  Rfl:     atorvastatin (LIPITOR) 40 MG tablet, Take 1 tablet by mouth Daily., Disp: 90 tablet, Rfl: 3    nitroglycerin (NITROSTAT) 0.4 MG SL tablet, Place 1 tablet under the tongue Every 5 (Five) Minutes As Needed for Chest Pain. Take no more than 3 doses in 15 minutes., Disp: 25 tablet, Rfl: 12    isosorbide mononitrate (IMDUR) 30 MG 24 hr tablet, Take 1 tablet by mouth Daily., Disp: 90 tablet, Rfl: 5    metoprolol tartrate (LOPRESSOR) 25 MG tablet, Take 1 tablet by mouth 2 (Two) Times a Day., Disp: 180 tablet, Rfl: 5    Past Medical History:   Diagnosis Date    Hyperlipidemia        Past Surgical History:   Procedure Laterality Date    TONSILLECTOMY         Family History   Problem Relation Age of Onset    Other Mother         ms    Heart disease Mother     Hyperlipidemia Mother     Cancer Father         lung, prostate       Social History     Tobacco Use    Smoking status: Former     Current packs/day: 0.00     Average packs/day: 1 pack/day for 25.0 years (25.0 ttl pk-yrs)     Types: Cigarettes     Start date: 2/11/1984     Quit date: 2/11/2009     Years since quitting: 15.1    Smokeless tobacco: Never   Substance Use Topics    Alcohol use: Yes     Comment: socially            Objective     Vitals:    04/11/24 1331   BP: 130/72   Pulse: 72   Resp: 17   Temp: 97.5 °F (36.4 °C)   SpO2: 96%     Body mass index is 36.88 kg/m².    Physical Exam  Vitals reviewed.   Constitutional:       Appearance: He is well-developed. He is not diaphoretic.   HENT:      Head: Normocephalic and atraumatic.   Eyes:      General: No scleral icterus.     Pupils: Pupils are equal, round, and reactive to light.   Neck:      Thyroid: No thyromegaly.   Cardiovascular:      Rate and Rhythm: Normal rate and regular rhythm.      Heart sounds: No murmur heard.     No friction rub. No gallop.   Pulmonary:      Effort: Pulmonary effort is normal. No respiratory distress.      Breath sounds: No wheezing or rales.   Chest:      Chest wall: No  tenderness.   Abdominal:      General: Bowel sounds are normal. There is no distension.      Palpations: Abdomen is soft.      Tenderness: There is no abdominal tenderness.   Musculoskeletal:         General: No deformity. Normal range of motion.   Lymphadenopathy:      Cervical: No cervical adenopathy.   Skin:     General: Skin is warm and dry.      Findings: No rash.   Neurological:      Cranial Nerves: No cranial nerve deficit.      Motor: No abnormal muscle tone.         Lab Results   Component Value Date    GLUCOSE 79 02/22/2024    BUN 13 02/22/2024    CREATININE 1.20 02/22/2024    EGFRIFNONA 69 06/19/2020    EGFRIFAFRI 84 06/19/2020    BCR 11 02/22/2024    K 4.7 02/22/2024    CO2 23 02/22/2024    CALCIUM 9.6 02/22/2024    PROTENTOTREF 7.1 02/22/2024    ALBUMIN 4.5 02/22/2024    LABIL2 1.7 02/22/2024    AST 32 02/22/2024    ALT 60 (H) 02/22/2024       WBC   Date Value Ref Range Status   02/22/2024 9.0 3.4 - 10.8 x10E3/uL Final   06/15/2020 7.70 4.5 - 11.0 10*3/uL Final     RBC   Date Value Ref Range Status   02/22/2024 5.60 4.14 - 5.80 x10E6/uL Final   06/15/2020 5.00 4.5 - 5.9 10*6/uL Final     Hemoglobin   Date Value Ref Range Status   02/22/2024 16.9 13.0 - 17.7 g/dL Final   06/15/2020 14.7 13.5 - 17.5 g/dL Final     Hematocrit   Date Value Ref Range Status   02/22/2024 49.8 37.5 - 51.0 % Final   06/15/2020 44.9 41.0 - 53.0 % Final     MCV   Date Value Ref Range Status   02/22/2024 89 79 - 97 fL Final   06/15/2020 89.8 80.0 - 100.0 fL Final     MCH   Date Value Ref Range Status   02/22/2024 30.2 26.6 - 33.0 pg Final   06/15/2020 29.4 26.0 - 34.0 pg Final     MCHC   Date Value Ref Range Status   02/22/2024 33.9 31.5 - 35.7 g/dL Final   06/15/2020 32.7 31.0 - 37.0 g/dL Final     RDW   Date Value Ref Range Status   02/22/2024 12.9 11.6 - 15.4 % Final   06/15/2020 15.5 12.0 - 16.8 % Final     MPV   Date Value Ref Range Status   06/15/2020 10.6 6.7 - 10.8 fL Final     Platelets   Date Value Ref Range Status  "  02/22/2024 220 150 - 450 x10E3/uL Final   06/15/2020 186 140 - 440 10*3/uL Final     Neutrophil Rel %   Date Value Ref Range Status   02/22/2024 56 Not Estab. % Final   06/15/2020 64.5 45 - 80 % Final     Lymphocyte Rel %   Date Value Ref Range Status   02/22/2024 29 Not Estab. % Final   06/15/2020 21.2 15 - 50 % Final     Monocyte Rel %   Date Value Ref Range Status   02/22/2024 9 Not Estab. % Final   06/15/2020 10.1 0 - 15 % Final     Eosinophil Rel %   Date Value Ref Range Status   02/22/2024 5 Not Estab. % Final     Eosinophil %   Date Value Ref Range Status   06/15/2020 3.4 0 - 7 % Final     Basophil Rel %   Date Value Ref Range Status   02/22/2024 1 Not Estab. % Final   06/15/2020 0.5 0 - 2 % Final     Immature Grans %   Date Value Ref Range Status   06/15/2020 0.3 (H) 0 % Final     Neutrophils Absolute   Date Value Ref Range Status   02/22/2024 5.0 1.4 - 7.0 x10E3/uL Final   06/15/2020 4.97 2.0 - 8.8 10*3/uL Final     Lymphocytes Absolute   Date Value Ref Range Status   02/22/2024 2.6 0.7 - 3.1 x10E3/uL Final   06/15/2020 1.63 0.7 - 5.5 10*3/uL Final     Monocytes Absolute   Date Value Ref Range Status   02/22/2024 0.8 0.1 - 0.9 x10E3/uL Final   06/15/2020 0.78 0.0 - 1.7 10*3/uL Final     Eosinophils Absolute   Date Value Ref Range Status   02/22/2024 0.5 (H) 0.0 - 0.4 x10E3/uL Final   06/15/2020 0.26 0.0 - 0.8 10*3/uL Final     Basophils Absolute   Date Value Ref Range Status   02/22/2024 0.1 0.0 - 0.2 x10E3/uL Final   06/15/2020 0.04 0.0 - 0.2 10*3/uL Final     Immature Grans, Absolute   Date Value Ref Range Status   06/15/2020 0.02 <1 10*3/uL Final     nRBC   Date Value Ref Range Status   06/15/2020 0 0 /100(WBC) Final       No results found for: \"HGBA1C\"    No results found for: \"CMCFROQW98\"    No results found for: \"TSH\"    No results found for: \"CHOL\"  Lab Results   Component Value Date    TRIG 146 02/22/2024     Lab Results   Component Value Date    HDL 36 (L) 02/22/2024     Lab Results   Component " "Value Date    LDL 93 02/22/2024     Lab Results   Component Value Date    VLDL 26 02/22/2024     No results found for: \"LDLHDL\"      Procedures    Assessment & Plan   Problems Addressed this Visit       Hyperlipidemia    Exertional angina - Primary    Relevant Medications    metoprolol tartrate (LOPRESSOR) 25 MG tablet    isosorbide mononitrate (IMDUR) 30 MG 24 hr tablet     Diagnoses         Codes Comments    Exertional angina    -  Primary ICD-10-CM: I20.89  ICD-9-CM: 413.9     Mixed hyperlipidemia     ICD-10-CM: E78.2  ICD-9-CM: 272.2           Exertional angina.  Uncontrolled.  Improved with medication tx.  Encouraged pt to discuss cath with cards as I recommend cath to evaluate coronary anatomy.  Continue atorvastatin/asa/metoprolol.  Rx for metoprolol.    Hyperlipidmeia.  Uncontrolled.  Continue statin.  Hold on exercise until heart evaluated fully with cath.   No orders of the defined types were placed in this encounter.      Current Outpatient Medications   Medication Sig Dispense Refill    aspirin 81 MG EC tablet Take 2 tablets by mouth Daily.      atorvastatin (LIPITOR) 40 MG tablet Take 1 tablet by mouth Daily. 90 tablet 3    isosorbide mononitrate (IMDUR) 30 MG 24 hr tablet Take 1 tablet by mouth Daily. 90 tablet 5    metoprolol tartrate (LOPRESSOR) 25 MG tablet Take 1 tablet by mouth 2 (Two) Times a Day. 180 tablet 5    nitroglycerin (NITROSTAT) 0.4 MG SL tablet Place 1 tablet under the tongue Every 5 (Five) Minutes As Needed for Chest Pain. Take no more than 3 doses in 15 minutes. 25 tablet 12     No current facility-administered medications for this visit.       Johnathan Olsen had no medications administered during this visit.    Return in about 3 months (around 7/11/2024).    There are no Patient Instructions on file for this visit.       "

## 2024-04-18 ENCOUNTER — LAB (OUTPATIENT)
Dept: LAB | Facility: HOSPITAL | Age: 59
End: 2024-04-18
Payer: COMMERCIAL

## 2024-04-18 ENCOUNTER — TRANSCRIBE ORDERS (OUTPATIENT)
Dept: CARDIOLOGY | Facility: CLINIC | Age: 59
End: 2024-04-18
Payer: COMMERCIAL

## 2024-04-18 ENCOUNTER — OFFICE VISIT (OUTPATIENT)
Dept: CARDIOLOGY | Facility: CLINIC | Age: 59
End: 2024-04-18
Payer: COMMERCIAL

## 2024-04-18 VITALS
DIASTOLIC BLOOD PRESSURE: 78 MMHG | BODY MASS INDEX: 36.65 KG/M2 | SYSTOLIC BLOOD PRESSURE: 134 MMHG | HEART RATE: 62 BPM | HEIGHT: 70 IN | WEIGHT: 256 LBS

## 2024-04-18 DIAGNOSIS — Z01.810 PRE-OPERATIVE CARDIOVASCULAR EXAMINATION: Primary | ICD-10-CM

## 2024-04-18 DIAGNOSIS — E78.2 MIXED HYPERLIPIDEMIA: ICD-10-CM

## 2024-04-18 DIAGNOSIS — Z13.6 SCREENING FOR ISCHEMIC HEART DISEASE: ICD-10-CM

## 2024-04-18 DIAGNOSIS — I45.10 RBBB (RIGHT BUNDLE BRANCH BLOCK): ICD-10-CM

## 2024-04-18 DIAGNOSIS — I49.1 APC (ATRIAL PREMATURE CONTRACTIONS): ICD-10-CM

## 2024-04-18 DIAGNOSIS — I20.89 EXERTIONAL ANGINA: Primary | ICD-10-CM

## 2024-04-18 DIAGNOSIS — Z01.810 PRE-OPERATIVE CARDIOVASCULAR EXAMINATION: ICD-10-CM

## 2024-04-18 LAB
ANION GAP SERPL CALCULATED.3IONS-SCNC: 8 MMOL/L (ref 5–15)
BASOPHILS # BLD AUTO: 0.11 10*3/MM3 (ref 0–0.2)
BASOPHILS NFR BLD AUTO: 1.1 % (ref 0–1.5)
BUN SERPL-MCNC: 15 MG/DL (ref 6–20)
BUN/CREAT SERPL: 11.9 (ref 7–25)
CALCIUM SPEC-SCNC: 8.7 MG/DL (ref 8.6–10.5)
CHLORIDE SERPL-SCNC: 105 MMOL/L (ref 98–107)
CO2 SERPL-SCNC: 26 MMOL/L (ref 22–29)
CREAT SERPL-MCNC: 1.26 MG/DL (ref 0.76–1.27)
DEPRECATED RDW RBC AUTO: 44.7 FL (ref 37–54)
EGFRCR SERPLBLD CKD-EPI 2021: 65.7 ML/MIN/1.73
EOSINOPHIL # BLD AUTO: 0.37 10*3/MM3 (ref 0–0.4)
EOSINOPHIL NFR BLD AUTO: 3.8 % (ref 0.3–6.2)
ERYTHROCYTE [DISTWIDTH] IN BLOOD BY AUTOMATED COUNT: 13.1 % (ref 12.3–15.4)
GLUCOSE SERPL-MCNC: 94 MG/DL (ref 65–99)
HCT VFR BLD AUTO: 48.9 % (ref 37.5–51)
HGB BLD-MCNC: 16.2 G/DL (ref 13–17.7)
IMM GRANULOCYTES # BLD AUTO: 0.04 10*3/MM3 (ref 0–0.05)
IMM GRANULOCYTES NFR BLD AUTO: 0.4 % (ref 0–0.5)
LYMPHOCYTES # BLD AUTO: 2.62 10*3/MM3 (ref 0.7–3.1)
LYMPHOCYTES NFR BLD AUTO: 26.8 % (ref 19.6–45.3)
MCH RBC QN AUTO: 30.2 PG (ref 26.6–33)
MCHC RBC AUTO-ENTMCNC: 33.1 G/DL (ref 31.5–35.7)
MCV RBC AUTO: 91.2 FL (ref 79–97)
MONOCYTES # BLD AUTO: 1.03 10*3/MM3 (ref 0.1–0.9)
MONOCYTES NFR BLD AUTO: 10.5 % (ref 5–12)
NEUTROPHILS NFR BLD AUTO: 5.62 10*3/MM3 (ref 1.7–7)
NEUTROPHILS NFR BLD AUTO: 57.4 % (ref 42.7–76)
NRBC BLD AUTO-RTO: 0 /100 WBC (ref 0–0.2)
PLATELET # BLD AUTO: 214 10*3/MM3 (ref 140–450)
PMV BLD AUTO: 10.3 FL (ref 6–12)
POTASSIUM SERPL-SCNC: 5 MMOL/L (ref 3.5–5.2)
RBC # BLD AUTO: 5.36 10*6/MM3 (ref 4.14–5.8)
SODIUM SERPL-SCNC: 139 MMOL/L (ref 136–145)
WBC NRBC COR # BLD AUTO: 9.79 10*3/MM3 (ref 3.4–10.8)

## 2024-04-18 PROCEDURE — 80048 BASIC METABOLIC PNL TOTAL CA: CPT

## 2024-04-18 PROCEDURE — 36415 COLL VENOUS BLD VENIPUNCTURE: CPT

## 2024-04-18 PROCEDURE — 85025 COMPLETE CBC W/AUTO DIFF WBC: CPT

## 2024-04-18 NOTE — H&P (VIEW-ONLY)
Subjective:     Encounter Date:04/18/2024      Patient ID: Johnathan Olsen is a 59 y.o. male.    Chief Complaint:  History of Present Illness  This is a 59-year-old male who follows with Dr. Dixon and is new to me today.  He has a past medical history of hyperlipidemia, right bundle branch block, and premature atrial contractions.  He saw Dr. Dixon for the first time in February 2024.  At that time he reported some chest discomfort located in the middle of his chest.  It was occurring with activity but did not occur every time that he was active.  His EKG at that office visit showed no acute changes.  His symptoms were concerning for angina.  He was already on aspirin and atorvastatin at that time.  Dr. Dioxn started him on metoprolol 25 mg twice a day to see if this would help with his symptoms.  A stress test was performed that showed no evidence of ischemia.  An echocardiogram was also performed that was unremarkable.  The results were called to the patient, he reported that his chest discomfort was better with the beta-blocker but he was continue to have some discomfort in different from what he was experiencing before.  Cardiac catheterization was discussed with the patient and he wanted to hold off a little bit longer before proceeding with this.  He was then started on isosorbide mononitrate 30 mg daily and asked to follow-up in 1 month.    He is here today for his 1 month follow-up.  He continues to have chest pain with exertion but this has improved with initiation of isosorbide mononitrate.  He says now he rates it a 2-3 out of 10.  The pain is on the left side of his chest but radiates into his neck and feels like a squeezing sensation.  He does feel little short of breath at times with exertion but never at rest.  He was able to walk in from the parking lot and from the waiting room with no shortness of breath.  He states he can climb a flight of steps without getting short of breath.  He denies any  palpitations, dizziness or syncope.  He denies any swelling in his lower extremities, orthopnea, or PND.  Chest pain with exertion. Improved with imdur now a 2-3/10. Kind of leftside but more like a squeezing in neck.    I have reviewed and updated as appropriate allergies, current medications, past family history, past medical history, past surgical history and problem list.    Review of Systems   Constitutional: Negative for fever, malaise/fatigue, weight gain and weight loss.   HENT:  Negative for congestion, hoarse voice and sore throat.    Eyes:  Negative for blurred vision and double vision.   Cardiovascular:  Positive for chest pain. Negative for dyspnea on exertion, leg swelling, orthopnea, palpitations and syncope.   Respiratory:  Negative for cough, shortness of breath and wheezing.    Musculoskeletal:  Positive for neck pain.   Gastrointestinal:  Negative for abdominal pain, hematemesis, hematochezia and melena.   Genitourinary:  Negative for dysuria and hematuria.   Neurological:  Negative for dizziness, headaches, light-headedness and numbness.   Psychiatric/Behavioral:  Negative for depression. The patient is not nervous/anxious.          Current Outpatient Medications:     aspirin 81 MG EC tablet, Take 2 tablets by mouth Daily., Disp: , Rfl:     atorvastatin (LIPITOR) 40 MG tablet, Take 1 tablet by mouth Daily., Disp: 90 tablet, Rfl: 3    isosorbide mononitrate (IMDUR) 30 MG 24 hr tablet, Take 1 tablet by mouth Daily., Disp: 90 tablet, Rfl: 5    metoprolol tartrate (LOPRESSOR) 25 MG tablet, Take 1 tablet by mouth 2 (Two) Times a Day., Disp: 180 tablet, Rfl: 5    nitroglycerin (NITROSTAT) 0.4 MG SL tablet, Place 1 tablet under the tongue Every 5 (Five) Minutes As Needed for Chest Pain. Take no more than 3 doses in 15 minutes., Disp: 25 tablet, Rfl: 12    Past Medical History:   Diagnosis Date    Hyperlipidemia        Past Surgical History:   Procedure Laterality Date    TONSILLECTOMY         Family  "History   Problem Relation Age of Onset    Other Mother         ms    Heart disease Mother     Hyperlipidemia Mother     Cancer Father         lung, prostate       Social History     Tobacco Use    Smoking status: Former     Current packs/day: 0.00     Average packs/day: 1 pack/day for 25.0 years (25.0 ttl pk-yrs)     Types: Cigarettes     Start date: 2/11/1984     Quit date: 2/11/2009     Years since quitting: 15.1    Smokeless tobacco: Never   Vaping Use    Vaping status: Never Used   Substance Use Topics    Alcohol use: Yes     Comment: socially    Drug use: Never         ECG 12 Lead    Date/Time: 4/18/2024 10:09 AM  Performed by: Fozia Garcia APRN    Authorized by: Fozia Garcia APRN  Comparison: compared with previous ECG from 2/23/2024  Similar to previous ECG  Rhythm: sinus rhythm  Conduction: incomplete right bundle branch block  T inversion: II and III             Objective:     Visit Vitals  /78   Pulse 62   Ht 177.8 cm (70\")   Wt 116 kg (256 lb)   BMI 36.73 kg/m²             Physical Exam  Constitutional:       Appearance: Normal appearance. He is obese.   HENT:      Head: Normocephalic.   Neck:      Vascular: No carotid bruit.   Cardiovascular:      Rate and Rhythm: Normal rate and regular rhythm.      Chest Wall: PMI is not displaced.      Pulses: Normal pulses.           Radial pulses are 2+ on the right side and 2+ on the left side.        Posterior tibial pulses are 2+ on the right side and 2+ on the left side.      Heart sounds: Normal heart sounds. No murmur heard.     No friction rub. No gallop.   Pulmonary:      Effort: Pulmonary effort is normal.      Breath sounds: Normal breath sounds.   Abdominal:      General: Bowel sounds are normal. There is no distension.      Palpations: Abdomen is soft.   Musculoskeletal:      Right lower leg: No edema.      Left lower leg: No edema.   Skin:     General: Skin is warm and dry.      Capillary Refill: Capillary refill takes less than 2 seconds. "   Neurological:      Mental Status: He is alert and oriented to person, place, and time.   Psychiatric:         Mood and Affect: Mood normal.         Behavior: Behavior normal.         Thought Content: Thought content normal.          Lab Review:   Lipid Panel          2/22/2024    14:05   Lipid Panel   Total Cholesterol 155    Triglycerides 146    HDL Cholesterol 36    VLDL Cholesterol 26    LDL Cholesterol  93          Cardiac Procedures:       Assessment:         Diagnoses and all orders for this visit:    1. Exertional angina (Primary)  -     Case Request Cath Lab: Left Heart Cath    2. RBBB (right bundle branch block)    3. Mixed hyperlipidemia    4. APC (atrial premature contractions)    Other orders  -     ECG 12 Lead            Plan:       Exertional angina: some improvement with initiation of metoprolol and isosorbide. However, he continues to have exertional discomfort with radiation to his neck. Discussed increasing isosorbide vs proceeding with cardiac catheterization. At this point, he is concerned that there is a blockage that is causing the symptoms and would like to proceed with a cath. Will schedule with Dr. Dixon, likely next week. In the meantime, I instructed that he can increase his isosorbide to 60 mg if he feels worsening symptoms.  HLD: on statin. Managed by Dr. Stratton  History of PACs: no complaints of palpitations. No PACs on EKG today.  Chronic incomplete RBBB    Thank you for allowing me to participate in this patient's care. Please call with any questions or concerns. Follow up to be determined after cardiac catheterization.          Your medication list            Accurate as of April 18, 2024 10:11 AM. If you have any questions, ask your nurse or doctor.                CONTINUE taking these medications        Instructions Last Dose Given Next Dose Due   aspirin 81 MG EC tablet      Take 2 tablets by mouth Daily.       atorvastatin 40 MG tablet  Commonly known as: LIPITOR      Take 1  tablet by mouth Daily.       isosorbide mononitrate 30 MG 24 hr tablet  Commonly known as: IMDUR      Take 1 tablet by mouth Daily.       metoprolol tartrate 25 MG tablet  Commonly known as: LOPRESSOR      Take 1 tablet by mouth 2 (Two) Times a Day.       nitroglycerin 0.4 MG SL tablet  Commonly known as: NITROSTAT      Place 1 tablet under the tongue Every 5 (Five) Minutes As Needed for Chest Pain. Take no more than 3 doses in 15 minutes.                  ADAM Perez  04/18/24  9:39 AM EDT

## 2024-04-18 NOTE — PROGRESS NOTES
Subjective:     Encounter Date:04/18/2024      Patient ID: Johnathan Olsen is a 59 y.o. male.    Chief Complaint:  History of Present Illness  This is a 59-year-old male who follows with Dr. Dixon and is new to me today.  He has a past medical history of hyperlipidemia, right bundle branch block, and premature atrial contractions.  He saw Dr. Dixon for the first time in February 2024.  At that time he reported some chest discomfort located in the middle of his chest.  It was occurring with activity but did not occur every time that he was active.  His EKG at that office visit showed no acute changes.  His symptoms were concerning for angina.  He was already on aspirin and atorvastatin at that time.  Dr. Dixon started him on metoprolol 25 mg twice a day to see if this would help with his symptoms.  A stress test was performed that showed no evidence of ischemia.  An echocardiogram was also performed that was unremarkable.  The results were called to the patient, he reported that his chest discomfort was better with the beta-blocker but he was continue to have some discomfort in different from what he was experiencing before.  Cardiac catheterization was discussed with the patient and he wanted to hold off a little bit longer before proceeding with this.  He was then started on isosorbide mononitrate 30 mg daily and asked to follow-up in 1 month.    He is here today for his 1 month follow-up.  He continues to have chest pain with exertion but this has improved with initiation of isosorbide mononitrate.  He says now he rates it a 2-3 out of 10.  The pain is on the left side of his chest but radiates into his neck and feels like a squeezing sensation.  He does feel little short of breath at times with exertion but never at rest.  He was able to walk in from the parking lot and from the waiting room with no shortness of breath.  He states he can climb a flight of steps without getting short of breath.  He denies any  palpitations, dizziness or syncope.  He denies any swelling in his lower extremities, orthopnea, or PND.  Chest pain with exertion. Improved with imdur now a 2-3/10. Kind of leftside but more like a squeezing in neck.    I have reviewed and updated as appropriate allergies, current medications, past family history, past medical history, past surgical history and problem list.    Review of Systems   Constitutional: Negative for fever, malaise/fatigue, weight gain and weight loss.   HENT:  Negative for congestion, hoarse voice and sore throat.    Eyes:  Negative for blurred vision and double vision.   Cardiovascular:  Positive for chest pain. Negative for dyspnea on exertion, leg swelling, orthopnea, palpitations and syncope.   Respiratory:  Negative for cough, shortness of breath and wheezing.    Musculoskeletal:  Positive for neck pain.   Gastrointestinal:  Negative for abdominal pain, hematemesis, hematochezia and melena.   Genitourinary:  Negative for dysuria and hematuria.   Neurological:  Negative for dizziness, headaches, light-headedness and numbness.   Psychiatric/Behavioral:  Negative for depression. The patient is not nervous/anxious.          Current Outpatient Medications:     aspirin 81 MG EC tablet, Take 2 tablets by mouth Daily., Disp: , Rfl:     atorvastatin (LIPITOR) 40 MG tablet, Take 1 tablet by mouth Daily., Disp: 90 tablet, Rfl: 3    isosorbide mononitrate (IMDUR) 30 MG 24 hr tablet, Take 1 tablet by mouth Daily., Disp: 90 tablet, Rfl: 5    metoprolol tartrate (LOPRESSOR) 25 MG tablet, Take 1 tablet by mouth 2 (Two) Times a Day., Disp: 180 tablet, Rfl: 5    nitroglycerin (NITROSTAT) 0.4 MG SL tablet, Place 1 tablet under the tongue Every 5 (Five) Minutes As Needed for Chest Pain. Take no more than 3 doses in 15 minutes., Disp: 25 tablet, Rfl: 12    Past Medical History:   Diagnosis Date    Hyperlipidemia        Past Surgical History:   Procedure Laterality Date    TONSILLECTOMY         Family  "History   Problem Relation Age of Onset    Other Mother         ms    Heart disease Mother     Hyperlipidemia Mother     Cancer Father         lung, prostate       Social History     Tobacco Use    Smoking status: Former     Current packs/day: 0.00     Average packs/day: 1 pack/day for 25.0 years (25.0 ttl pk-yrs)     Types: Cigarettes     Start date: 2/11/1984     Quit date: 2/11/2009     Years since quitting: 15.1    Smokeless tobacco: Never   Vaping Use    Vaping status: Never Used   Substance Use Topics    Alcohol use: Yes     Comment: socially    Drug use: Never         ECG 12 Lead    Date/Time: 4/18/2024 10:09 AM  Performed by: Fozia Garcia APRN    Authorized by: Fozia Garcia APRN  Comparison: compared with previous ECG from 2/23/2024  Similar to previous ECG  Rhythm: sinus rhythm  Conduction: incomplete right bundle branch block  T inversion: II and III             Objective:     Visit Vitals  /78   Pulse 62   Ht 177.8 cm (70\")   Wt 116 kg (256 lb)   BMI 36.73 kg/m²             Physical Exam  Constitutional:       Appearance: Normal appearance. He is obese.   HENT:      Head: Normocephalic.   Neck:      Vascular: No carotid bruit.   Cardiovascular:      Rate and Rhythm: Normal rate and regular rhythm.      Chest Wall: PMI is not displaced.      Pulses: Normal pulses.           Radial pulses are 2+ on the right side and 2+ on the left side.        Posterior tibial pulses are 2+ on the right side and 2+ on the left side.      Heart sounds: Normal heart sounds. No murmur heard.     No friction rub. No gallop.   Pulmonary:      Effort: Pulmonary effort is normal.      Breath sounds: Normal breath sounds.   Abdominal:      General: Bowel sounds are normal. There is no distension.      Palpations: Abdomen is soft.   Musculoskeletal:      Right lower leg: No edema.      Left lower leg: No edema.   Skin:     General: Skin is warm and dry.      Capillary Refill: Capillary refill takes less than 2 seconds. "   Neurological:      Mental Status: He is alert and oriented to person, place, and time.   Psychiatric:         Mood and Affect: Mood normal.         Behavior: Behavior normal.         Thought Content: Thought content normal.          Lab Review:   Lipid Panel          2/22/2024    14:05   Lipid Panel   Total Cholesterol 155    Triglycerides 146    HDL Cholesterol 36    VLDL Cholesterol 26    LDL Cholesterol  93          Cardiac Procedures:       Assessment:         Diagnoses and all orders for this visit:    1. Exertional angina (Primary)  -     Case Request Cath Lab: Left Heart Cath    2. RBBB (right bundle branch block)    3. Mixed hyperlipidemia    4. APC (atrial premature contractions)    Other orders  -     ECG 12 Lead            Plan:       Exertional angina: some improvement with initiation of metoprolol and isosorbide. However, he continues to have exertional discomfort with radiation to his neck. Discussed increasing isosorbide vs proceeding with cardiac catheterization. At this point, he is concerned that there is a blockage that is causing the symptoms and would like to proceed with a cath. Will schedule with Dr. Dixon, likely next week. In the meantime, I instructed that he can increase his isosorbide to 60 mg if he feels worsening symptoms.  HLD: on statin. Managed by Dr. Stratton  History of PACs: no complaints of palpitations. No PACs on EKG today.  Chronic incomplete RBBB    Thank you for allowing me to participate in this patient's care. Please call with any questions or concerns. Follow up to be determined after cardiac catheterization.          Your medication list            Accurate as of April 18, 2024 10:11 AM. If you have any questions, ask your nurse or doctor.                CONTINUE taking these medications        Instructions Last Dose Given Next Dose Due   aspirin 81 MG EC tablet      Take 2 tablets by mouth Daily.       atorvastatin 40 MG tablet  Commonly known as: LIPITOR      Take 1  tablet by mouth Daily.       isosorbide mononitrate 30 MG 24 hr tablet  Commonly known as: IMDUR      Take 1 tablet by mouth Daily.       metoprolol tartrate 25 MG tablet  Commonly known as: LOPRESSOR      Take 1 tablet by mouth 2 (Two) Times a Day.       nitroglycerin 0.4 MG SL tablet  Commonly known as: NITROSTAT      Place 1 tablet under the tongue Every 5 (Five) Minutes As Needed for Chest Pain. Take no more than 3 doses in 15 minutes.                  ADAM Perez  04/18/24  9:39 AM EDT

## 2024-04-29 ENCOUNTER — HOSPITAL ENCOUNTER (OUTPATIENT)
Facility: HOSPITAL | Age: 59
Setting detail: HOSPITAL OUTPATIENT SURGERY
Discharge: HOME | End: 2024-04-29
Attending: INTERNAL MEDICINE | Admitting: INTERNAL MEDICINE
Payer: COMMERCIAL

## 2024-04-29 VITALS
TEMPERATURE: 97.3 F | OXYGEN SATURATION: 94 % | HEIGHT: 70 IN | HEART RATE: 61 BPM | SYSTOLIC BLOOD PRESSURE: 122 MMHG | WEIGHT: 245 LBS | RESPIRATION RATE: 16 BRPM | BODY MASS INDEX: 35.07 KG/M2 | DIASTOLIC BLOOD PRESSURE: 72 MMHG

## 2024-04-29 DIAGNOSIS — I20.89 EXERTIONAL ANGINA: ICD-10-CM

## 2024-04-29 LAB — ACT BLD: 342 SECONDS (ref 82–152)

## 2024-04-29 PROCEDURE — C1769 GUIDE WIRE: HCPCS | Performed by: INTERNAL MEDICINE

## 2024-04-29 PROCEDURE — 25010000002 HEPARIN (PORCINE) PER 1000 UNITS: Performed by: INTERNAL MEDICINE

## 2024-04-29 PROCEDURE — C1887 CATHETER, GUIDING: HCPCS | Performed by: INTERNAL MEDICINE

## 2024-04-29 PROCEDURE — 93458 L HRT ARTERY/VENTRICLE ANGIO: CPT | Performed by: INTERNAL MEDICINE

## 2024-04-29 PROCEDURE — 25010000002 FENTANYL CITRATE (PF) 50 MCG/ML SOLUTION: Performed by: INTERNAL MEDICINE

## 2024-04-29 PROCEDURE — 25010000002 MIDAZOLAM PER 1 MG: Performed by: INTERNAL MEDICINE

## 2024-04-29 PROCEDURE — C1894 INTRO/SHEATH, NON-LASER: HCPCS | Performed by: INTERNAL MEDICINE

## 2024-04-29 PROCEDURE — 25810000003 SODIUM CHLORIDE 0.9 % SOLUTION: Performed by: INTERNAL MEDICINE

## 2024-04-29 PROCEDURE — 85347 COAGULATION TIME ACTIVATED: CPT

## 2024-04-29 PROCEDURE — 25510000001 IOPAMIDOL PER 1 ML: Performed by: INTERNAL MEDICINE

## 2024-04-29 RX ORDER — VERAPAMIL HYDROCHLORIDE 2.5 MG/ML
INJECTION, SOLUTION INTRAVENOUS
Status: DISCONTINUED | OUTPATIENT
Start: 2024-04-29 | End: 2024-04-29 | Stop reason: HOSPADM

## 2024-04-29 RX ORDER — MIDAZOLAM HYDROCHLORIDE 1 MG/ML
INJECTION INTRAMUSCULAR; INTRAVENOUS
Status: DISCONTINUED | OUTPATIENT
Start: 2024-04-29 | End: 2024-04-29 | Stop reason: HOSPADM

## 2024-04-29 RX ORDER — ASPIRIN 81 MG/1
81 TABLET ORAL DAILY
Start: 2024-04-29

## 2024-04-29 RX ORDER — SODIUM CHLORIDE 9 MG/ML
75 INJECTION, SOLUTION INTRAVENOUS CONTINUOUS
Status: DISCONTINUED | OUTPATIENT
Start: 2024-04-29 | End: 2024-04-29 | Stop reason: HOSPADM

## 2024-04-29 RX ORDER — SODIUM CHLORIDE 0.9 % (FLUSH) 0.9 %
10 SYRINGE (ML) INJECTION AS NEEDED
Status: DISCONTINUED | OUTPATIENT
Start: 2024-04-29 | End: 2024-04-29 | Stop reason: HOSPADM

## 2024-04-29 RX ORDER — ISOSORBIDE MONONITRATE 30 MG/1
30 TABLET, EXTENDED RELEASE ORAL 2 TIMES DAILY
Start: 2024-04-29

## 2024-04-29 RX ORDER — SODIUM CHLORIDE 9 MG/ML
40 INJECTION, SOLUTION INTRAVENOUS AS NEEDED
Status: DISCONTINUED | OUTPATIENT
Start: 2024-04-29 | End: 2024-04-29 | Stop reason: HOSPADM

## 2024-04-29 RX ORDER — HEPARIN SODIUM 1000 [USP'U]/ML
INJECTION, SOLUTION INTRAVENOUS; SUBCUTANEOUS
Status: DISCONTINUED | OUTPATIENT
Start: 2024-04-29 | End: 2024-04-29 | Stop reason: HOSPADM

## 2024-04-29 RX ORDER — LIDOCAINE HYDROCHLORIDE 20 MG/ML
INJECTION, SOLUTION INFILTRATION; PERINEURAL
Status: DISCONTINUED | OUTPATIENT
Start: 2024-04-29 | End: 2024-04-29 | Stop reason: HOSPADM

## 2024-04-29 RX ORDER — ACETAMINOPHEN 325 MG/1
650 TABLET ORAL EVERY 4 HOURS PRN
Status: DISCONTINUED | OUTPATIENT
Start: 2024-04-29 | End: 2024-04-29 | Stop reason: HOSPADM

## 2024-04-29 RX ORDER — SODIUM CHLORIDE 0.9 % (FLUSH) 0.9 %
10 SYRINGE (ML) INJECTION EVERY 12 HOURS SCHEDULED
Status: DISCONTINUED | OUTPATIENT
Start: 2024-04-29 | End: 2024-04-29 | Stop reason: HOSPADM

## 2024-04-29 RX ORDER — FENTANYL CITRATE 50 UG/ML
INJECTION, SOLUTION INTRAMUSCULAR; INTRAVENOUS
Status: DISCONTINUED | OUTPATIENT
Start: 2024-04-29 | End: 2024-04-29 | Stop reason: HOSPADM

## 2024-04-29 RX ADMIN — SODIUM CHLORIDE 75 ML/HR: 9 INJECTION, SOLUTION INTRAVENOUS at 09:25

## 2024-04-29 NOTE — PERIOPERATIVE NURSING NOTE
Clarified with Dr. Dixon in regards to patient's home medication Imdur, states to take 60mg and could be one or twice a day if the patient would like.

## 2024-04-29 NOTE — Clinical Note
Hemostasis started on the right radial artery. R-Band was used in achieving hemostasis. Radial compression device applied to vessel. Hemostasis achieved successfully. Closure device additional comment: TR Band applied-13atm

## 2024-04-29 NOTE — DISCHARGE INSTRUCTIONS
Per Dr. Dixon take Imdur 60mg once or twice a day, it is up to the patient      [facility]  [place] [address]    Coronary Angiogram (Radial/Ulnar Approach) After Care    Refer to this sheet in the next few weeks. These instructions provide you with information on caring for yourself after your procedure. Your caregiver may also give you more specific instructions. Your treatment has been planned according to current medical practices, but problems sometimes occur. Call your caregiver if you have any problems or questions after your procedure.    Home Care Instructions:  You may shower the day after the procedure. Remove the bandage (dressing) and gently wash the site with plain soap and water. Gently pat the site dry. You may apply a band aid daily for 2 days if desired.    Do not apply powder or lotion to the site.  Do not submerge the affected site in water for 3 to 5 days or until the site is completely healed.   Do not lift, push or pull anything over 5 pounds for 5 days after your procedure or as directed by your physician.  As a reference, a gallon of milk weighs 8 pounds.   Inspect the site at least twice daily. You may notice some bruising at the site and it may be tender for 1 to 2 weeks.     Increase your fluid intake for the next 2 days.    Keep arm elevated for 24 hours. For the remainder of the day, keep your arm in “Pledge of Allegiance” position when up and about.     You may drive 24 hours after the procedure unless otherwise instructed by your caregiver.  Do not operate machinery or power tools for 24 hours.  A responsible adult should be with you for the first 24 hours after you arrive home. Do not make any important legal decisions or sign legal papers for 24 hours.  Do not drink alcohol for 24 hours.    Metformin or any medications containing Metformin should not be taken for 48 hours after your procedure.      Call Your Doctor if:   You have unusual pain at the  radial/ulnar (wrist) site.  You have redness, warmth, swelling, or pain at the radial/ulnar (wrist) site.  You have drainage (other than a small amount of blood on the dressing).  `You have chills or a fever > 101.  Your arm becomes pale or dark, cool, tingly, or numb.  You develop chest pain, shortness of breath, feel faint or pass out.    You have heavy bleeding from the site, hold pressure on the site for 20 minutes.  If the bleeding stops, apply a fresh bandage and call your cardiologist.  However, if you        continue to have bleeding, call 911 and continue to apply pressure to the site.   You have any symptoms of a stroke.  Remember BE FAST  B-balance. Sudden trouble walking or loss of balance.  E-eyes.  Sudden changes in how you see or a sudden onset of a very bad headache.   F-face. Sudden weakness or loss of feeling of the face or facial droop on one side.   A-arms Sudden weakness or numbness in one arm.  One arm drifts down if they are both held out in front of you. This happens suddenly and usually on one side of the body.   S-speech.  Sudden trouble speaking, slurred speech or trouble understanding what are saying.   T-time  Time to call emergency services.  Write down the symptoms and the time they started.

## 2024-05-16 ENCOUNTER — OFFICE VISIT (OUTPATIENT)
Age: 59
End: 2024-05-16
Payer: COMMERCIAL

## 2024-05-16 VITALS
WEIGHT: 247.8 LBS | DIASTOLIC BLOOD PRESSURE: 80 MMHG | HEIGHT: 70 IN | SYSTOLIC BLOOD PRESSURE: 119 MMHG | HEART RATE: 83 BPM | OXYGEN SATURATION: 95 % | BODY MASS INDEX: 35.48 KG/M2

## 2024-05-16 DIAGNOSIS — I49.1 APC (ATRIAL PREMATURE CONTRACTIONS): ICD-10-CM

## 2024-05-16 DIAGNOSIS — E78.2 MIXED HYPERLIPIDEMIA: ICD-10-CM

## 2024-05-16 DIAGNOSIS — I25.118 CORONARY ARTERY DISEASE OF NATIVE ARTERY OF NATIVE HEART WITH STABLE ANGINA PECTORIS: Primary | ICD-10-CM

## 2024-05-16 DIAGNOSIS — I45.10 RBBB (RIGHT BUNDLE BRANCH BLOCK): ICD-10-CM

## 2024-05-16 RX ORDER — ISOSORBIDE MONONITRATE 30 MG/1
30 TABLET, EXTENDED RELEASE ORAL 2 TIMES DAILY
Qty: 60 TABLET | Refills: 5
Start: 2024-05-16 | End: 2024-05-16

## 2024-05-16 RX ORDER — METOPROLOL SUCCINATE 25 MG/1
25 TABLET, EXTENDED RELEASE ORAL DAILY
Qty: 30 TABLET | Refills: 5 | Status: SHIPPED | OUTPATIENT
Start: 2024-05-16

## 2024-05-16 RX ORDER — ISOSORBIDE MONONITRATE 60 MG/1
60 TABLET, EXTENDED RELEASE ORAL DAILY
Qty: 30 TABLET | Refills: 5 | Status: SHIPPED | OUTPATIENT
Start: 2024-05-16

## 2024-05-16 NOTE — PROGRESS NOTES
Subjective:     Encounter Date:05/16/2024      Patient ID: Johnathan Olsen is a 59 y.o. male.    Chief Complaint:  History of Present Illness    This is a 59-year-old with hyperlipidemia, coronary artery disease, who presents for follow-up.     I saw the patient initially in 2/2024 when he presented for an evaluation of chest pain.  He reported that he began noticing episodes of chest discomfort about 2 months prior.  He describes the chest pain as occurring in the middle of his chest and occurring usually with activity.  He also noted episodes when lying in bed.  He also reported a couple of significant episodes that were quite severe with severe discomfort in his chest radiating up to his neck and jaw.  Both of these episodes occurred when he was starting himself more than normal.  The symptoms resolved when he stopped and rested.  He denies any other associated symptoms.       He reports that he underwent an echocardiogram and a Holter monitor several years ago for palpitations.  It appears that this was done with Dr. Taylor back in 2014.  His echocardiogram was unremarkable.  His Holter monitor showed frequent supraventricular ectopy was otherwise unremarkable.  Of note he was noted to have a right bundle branch block on his EKG at that time also.  He also reported a family history of coronary artery disease in his mother who underwent PCI at the age of 40.    Discussed further workup at length including stress test versus proceeding directly with cardiac catheterization.  The patient wanted to start with the noninvasive workup.  I started him on a beta-blocker with metoprolol tartrate 25 mg twice a day and set him up for an echocardiogram and a stress test.  These were performed on 3/5/2024.  Echocardiogram showed normal left ventricular function and wall motion with an EF of 63%, no significant valvular disease and normal right ventricular systolic pressure.  Stress test was negative for ischemia.  When I  called to go over the results of the testing he reported that the tightness was improved with the beta-blocker although he continue to experience some dull aching discomfort that was constant and will improve with position changes.  He was still having tightness in his neck with exertion.  We discussed options including proceeding with a cardiac catheterization.  I also recommended starting isosorbide mononitrate 30 mg daily.  The patient wanted to hold off on a cardiac catheterization at that time.    He returned in 4/2024.  He reported improvement in his symptoms with the initiation of the isosorbide mononitrate.  He was able to do more activity with only mild her symptoms.  Despite the improvement of his symptoms he wanted to go ahead and proceed with cardiac catheterization.    This was performed on 4/29/2024 and showed 100% chronic total occlusion of the mid right coronary artery with left-to-right collateral filling and otherwise mild nonobstructive disease of the left circumflex artery and a normal left main and left anterior descending arteries.  I did attempt to cross the mid right coronary artery but was unsuccessful confirming that this was a chronic total occlusion.  I recommended increasing his isosorbide mononitrate to 30 mg twice a day and see how he did with this before considering  intervention.    He reports overall he is doing well.  He continues to feel better with the addition of the isosorbide.  He is tolerating the 60 mg daily.  He cannot really tell if its made much of a difference to go up on that dosage.  He has been compliant with the metoprolol but admits that he has a hard time or to take the dosage in the evening.  He reports occasional episodes of chest discomfort this is usually pretty mild.  He did have an episode of chest discomfort when he was mowing the lawn.  He had been mowing for about 10 to 15 minutes before the symptoms started.  The symptoms were mild enough that he was  able to continue mowing the last 5 to 10 minutes.  The symptoms resolved when he rested.  Otherwise he feels like he is able to do what he wants to do without any significant discomfort or symptoms.    He denies any shortness of breath of the ordinary, palpitations, near-syncope or syncope or lower extremity swelling.  He does report some lightheadedness with position changes which is new since we adjusted his medications.    Review of Systems   Constitutional: Negative for malaise/fatigue.   HENT:  Negative for hearing loss, hoarse voice, nosebleeds and sore throat.    Eyes:  Negative for pain.   Cardiovascular:  Positive for chest pain and palpitations. Negative for claudication, cyanosis, dyspnea on exertion, irregular heartbeat, leg swelling, near-syncope, orthopnea, paroxysmal nocturnal dyspnea and syncope.   Respiratory:  Negative for shortness of breath and snoring.    Endocrine: Negative for cold intolerance, heat intolerance, polydipsia, polyphagia and polyuria.   Skin:  Negative for itching and rash.   Musculoskeletal:  Negative for arthritis, falls, joint pain, joint swelling, muscle cramps, muscle weakness and myalgias.   Gastrointestinal:  Negative for constipation, diarrhea, dysphagia, heartburn, hematemesis, hematochezia, melena, nausea and vomiting.   Genitourinary:  Negative for frequency, hematuria and hesitancy.   Neurological:  Positive for light-headedness. Negative for excessive daytime sleepiness, dizziness, headaches, numbness and weakness.   Psychiatric/Behavioral:  Negative for depression. The patient is not nervous/anxious.          Current Outpatient Medications:     aspirin 81 MG EC tablet, Take 1 tablet by mouth Daily., Disp: , Rfl:     atorvastatin (LIPITOR) 40 MG tablet, Take 1 tablet by mouth Daily., Disp: 90 tablet, Rfl: 3    isosorbide mononitrate (IMDUR) 30 MG 24 hr tablet, Take 1 tablet by mouth 2 (Two) Times a Day., Disp: , Rfl:     metoprolol tartrate (LOPRESSOR) 25 MG tablet,  "Take 1 tablet by mouth 2 (Two) Times a Day., Disp: 180 tablet, Rfl: 5    nitroglycerin (NITROSTAT) 0.4 MG SL tablet, Place 1 tablet under the tongue Every 5 (Five) Minutes As Needed for Chest Pain. Take no more than 3 doses in 15 minutes., Disp: 25 tablet, Rfl: 12    Past Medical History:   Diagnosis Date    Hyperlipidemia        Past Surgical History:   Procedure Laterality Date    CARDIAC CATHETERIZATION N/A 4/29/2024    Procedure: Left Heart Cath;  Surgeon: Tracy Dixon MD;  Location: [facility] CATH INVASIVE LOCATION;  Service: Cardiology;  Laterality: N/A;    CARDIAC CATHETERIZATION N/A 4/29/2024    Procedure: Coronary angiography;  Surgeon: Tracy Dixon MD;  Location: [facility] CATH INVASIVE LOCATION;  Service: Cardiology;  Laterality: N/A;    TONSILLECTOMY         Family History   Problem Relation Age of Onset    Other Mother         ms    Heart disease Mother     Hyperlipidemia Mother     Cancer Father         lung, prostate       Social History     Tobacco Use    Smoking status: Former     Current packs/day: 1.00     Average packs/day: 1 pack/day for 40.3 years (40.3 ttl pk-yrs)     Types: Cigarettes     Start date: 2/11/1984    Smokeless tobacco: Never   Vaping Use    Vaping status: Never Used   Substance Use Topics    Alcohol use: Yes     Comment: socially    Drug use: Never         ECG 12 Lead    Date/Time: 5/16/2024 1:53 PM  Performed by: Tracy Dixon MD    Authorized by: Tracy Dixon MD  Comparison: compared with previous ECG   Similar to previous ECG  Rhythm: sinus rhythm  Conduction: incomplete right bundle branch block             Objective:     Visit Vitals  /80 (BP Location: Left arm, Patient Position: Sitting, Cuff Size: Adult)   Pulse 83   Ht 177.8 cm (70\")   Wt 112 kg (247 lb 12.8 oz)   SpO2 95%   BMI 35.56 kg/m²         Constitutional:       Appearance: Normal appearance. Well-developed.   HENT:      Head: Normocephalic and atraumatic.   Neck:      Vascular: No carotid bruit or " JVD.   Pulmonary:      Effort: Pulmonary effort is normal.      Breath sounds: Normal breath sounds.   Cardiovascular:      Normal rate. Regular rhythm.      No gallop.    Pulses:     Radial: 2+ bilaterally.  Edema:     Peripheral edema absent.   Abdominal:      Palpations: Abdomen is soft.   Skin:     General: Skin is warm and dry.   Neurological:      Mental Status: Alert and oriented to person, place, and time.             Assessment:          Diagnosis Plan   1. Coronary artery disease of native artery of native heart with stable angina pectoris        2. APC (atrial premature contractions)        3. Mixed hyperlipidemia        4. RBBB (right bundle branch block)               Plan:       1.  Stable angina.  Appears to be class I-II.  Overall doing better with medical management.  He is having a hard time remembering the second dose of metoprolol to tartrate in the evening.  Discussed options for management including continuing medical management as long as his symptoms remain stable and continues to have decent exercise tolerance versus pursuing  intervention of the RCA.  The patient is comfortable with continuing current management for now.  Will switch his metoprolol to tartrate to metoprolol succinate 25 mg daily to ensure compliance.  2.  Coronary artery disease.  With mid RCA  with good left-to-right collateral filling.  Plan as per #1.  Otherwise continue current medical management.  3.  Hyperlipidemia.  On atorvastatin for goal LDL of 70 or below.  This is managed by Dr. Stratton.    Will plan on seeing the patient back again in 3 months.

## 2024-05-16 NOTE — LETTER
May 16, 2024       No Recipients    Patient: Johnathan Olsen   YOB: 1965   Date of Visit: 5/16/2024       Dear Jeancarlos Stratton MD,    Johnathan Olsen was in my office today. Below are the relevant portions of my assessment and plan of care.           If you have questions, please do not hesitate to call me. I look forward to following Johnathan along with you.         Sincerely,        Tracy Dixon MD        CC:   No Recipients

## 2024-07-29 ENCOUNTER — OFFICE VISIT (OUTPATIENT)
Dept: FAMILY MEDICINE CLINIC | Facility: CLINIC | Age: 59
End: 2024-07-29
Payer: COMMERCIAL

## 2024-07-29 VITALS
SYSTOLIC BLOOD PRESSURE: 124 MMHG | WEIGHT: 252.4 LBS | RESPIRATION RATE: 17 BRPM | HEART RATE: 77 BPM | BODY MASS INDEX: 36.13 KG/M2 | OXYGEN SATURATION: 98 % | TEMPERATURE: 97.8 F | DIASTOLIC BLOOD PRESSURE: 82 MMHG | HEIGHT: 70 IN

## 2024-07-29 DIAGNOSIS — R53.82 CHRONIC FATIGUE: ICD-10-CM

## 2024-07-29 DIAGNOSIS — I25.118 CORONARY ARTERY DISEASE OF NATIVE ARTERY OF NATIVE HEART WITH STABLE ANGINA PECTORIS: Primary | ICD-10-CM

## 2024-07-29 DIAGNOSIS — R68.82 LOW LIBIDO: ICD-10-CM

## 2024-07-29 PROCEDURE — 99214 OFFICE O/P EST MOD 30 MIN: CPT | Performed by: FAMILY MEDICINE

## 2024-07-29 NOTE — PROGRESS NOTES
Chief Complaint   Patient presents with    Exertional angina    Hyperlipidemia    Ear Fullness     Buzzing in R ear       Subjective   Johnathan Olsen is a 59 y.o. male.     Hyperlipidemia  Pertinent negatives include no chest pain, myalgias or shortness of breath.   Ear Fullness   Pertinent negatives include no abdominal pain, rash or sore throat.      F/U CAD.  Cath with 100% occlusion RCA with collaterals noted.   C/o trouble with low libido.  Trouble with energy.    The following portions of the patient's history were reviewed and updated as appropriate: allergies, current medications, past family history, past medical history, past social history, past surgical history and problem list.    Review of Systems   Constitutional:  Negative for appetite change and fatigue.   HENT:  Negative for nosebleeds and sore throat.    Eyes:  Negative for blurred vision and visual disturbance.   Respiratory:  Negative for shortness of breath and wheezing.    Cardiovascular:  Negative for chest pain and leg swelling.   Gastrointestinal:  Negative for abdominal distention and abdominal pain.   Endocrine: Negative for cold intolerance and polyuria.   Genitourinary:  Negative for dysuria and hematuria.   Musculoskeletal:  Negative for arthralgias and myalgias.   Skin:  Negative for color change and rash.   Neurological:  Negative for weakness and confusion.   Psychiatric/Behavioral:  Negative for agitation and depressed mood.        Patient Active Problem List   Diagnosis    APC (atrial premature contractions)    Hyperlipidemia    RBBB (right bundle branch block)    Family history of prostate cancer    Colon cancer screening    Closed fracture of multiple ribs of left side with routine healing    Screening for prostate cancer    Encounter for hepatitis C screening test for low risk patient    Encounter for annual physical exam    Benign prostatic hyperplasia with nocturia    Exertional angina    Coronary artery disease of native  artery of native heart with stable angina pectoris    Chronic fatigue    Low libido       No Known Allergies      Current Outpatient Medications:     aspirin 81 MG EC tablet, Take 1 tablet by mouth Daily., Disp: , Rfl:     atorvastatin (LIPITOR) 40 MG tablet, Take 1 tablet by mouth Daily., Disp: 90 tablet, Rfl: 3    isosorbide mononitrate (IMDUR) 60 MG 24 hr tablet, Take 1 tablet by mouth Daily., Disp: 30 tablet, Rfl: 5    metoprolol succinate XL (TOPROL-XL) 25 MG 24 hr tablet, Take 1 tablet by mouth Daily., Disp: 30 tablet, Rfl: 5    nitroglycerin (NITROSTAT) 0.4 MG SL tablet, Place 1 tablet under the tongue Every 5 (Five) Minutes As Needed for Chest Pain. Take no more than 3 doses in 15 minutes., Disp: 25 tablet, Rfl: 12    Past Medical History:   Diagnosis Date    Hyperlipidemia        Past Surgical History:   Procedure Laterality Date    CARDIAC CATHETERIZATION N/A 4/29/2024    Procedure: Left Heart Cath;  Surgeon: Tracy Dixon MD;  Location: [facility] CATH INVASIVE LOCATION;  Service: Cardiology;  Laterality: N/A;    CARDIAC CATHETERIZATION N/A 4/29/2024    Procedure: Coronary angiography;  Surgeon: Tracy Dixon MD;  Location:  [facility] CATH INVASIVE LOCATION;  Service: Cardiology;  Laterality: N/A;    TONSILLECTOMY         Family History   Problem Relation Age of Onset    Other Mother         ms    Heart disease Mother     Hyperlipidemia Mother     Cancer Father         lung, prostate       Social History     Tobacco Use    Smoking status: Former     Current packs/day: 1.00     Average packs/day: 1 pack/day for 40.5 years (40.5 ttl pk-yrs)     Types: Cigarettes     Start date: 2/11/1984    Smokeless tobacco: Never   Substance Use Topics    Alcohol use: Yes     Comment: socially            Objective     Vitals:    07/29/24 1058   BP: 124/82   Pulse: 77   Resp: 17   Temp: 97.8 °F (36.6 °C)   SpO2: 98%     Body mass index is 36.22 kg/m².    Physical Exam  Vitals reviewed.   Constitutional:       Appearance: He  is well-developed. He is not diaphoretic.   HENT:      Head: Normocephalic and atraumatic.   Eyes:      General: No scleral icterus.     Pupils: Pupils are equal, round, and reactive to light.   Neck:      Thyroid: No thyromegaly.   Cardiovascular:      Rate and Rhythm: Normal rate and regular rhythm.      Heart sounds: No murmur heard.     No friction rub. No gallop.   Pulmonary:      Effort: Pulmonary effort is normal. No respiratory distress.      Breath sounds: No wheezing or rales.   Chest:      Chest wall: No tenderness.   Abdominal:      General: Bowel sounds are normal. There is no distension.      Palpations: Abdomen is soft.      Tenderness: There is no abdominal tenderness.   Musculoskeletal:         General: No deformity. Normal range of motion.   Lymphadenopathy:      Cervical: No cervical adenopathy.   Skin:     General: Skin is warm and dry.      Findings: No rash.   Neurological:      Cranial Nerves: No cranial nerve deficit.      Motor: No abnormal muscle tone.         Lab Results   Component Value Date    GLUCOSE 94 04/18/2024    BUN 15 04/18/2024    CREATININE 1.26 04/18/2024    EGFRIFNONA 69 06/19/2020    EGFRIFAFRI 84 06/19/2020    BCR 11.9 04/18/2024    K 5.0 04/18/2024    CO2 26.0 04/18/2024    CALCIUM 8.7 04/18/2024    PROTENTOTREF 7.1 02/22/2024    ALBUMIN 4.5 02/22/2024    LABIL2 1.7 02/22/2024    AST 32 02/22/2024    ALT 60 (H) 02/22/2024       WBC   Date Value Ref Range Status   04/18/2024 9.79 3.40 - 10.80 10*3/mm3 Final   02/22/2024 9.0 3.4 - 10.8 x10E3/uL Final   06/15/2020 7.70 4.5 - 11.0 10*3/uL Final     RBC   Date Value Ref Range Status   04/18/2024 5.36 4.14 - 5.80 10*6/mm3 Final   02/22/2024 5.60 4.14 - 5.80 x10E6/uL Final   06/15/2020 5.00 4.5 - 5.9 10*6/uL Final     Hemoglobin   Date Value Ref Range Status   04/18/2024 16.2 13.0 - 17.7 g/dL Final   06/15/2020 14.7 13.5 - 17.5 g/dL Final     Hematocrit   Date Value Ref Range Status   04/18/2024 48.9 37.5 - 51.0 % Final    06/15/2020 44.9 41.0 - 53.0 % Final     MCV   Date Value Ref Range Status   04/18/2024 91.2 79.0 - 97.0 fL Final   06/15/2020 89.8 80.0 - 100.0 fL Final     MCH   Date Value Ref Range Status   04/18/2024 30.2 26.6 - 33.0 pg Final   06/15/2020 29.4 26.0 - 34.0 pg Final     MCHC   Date Value Ref Range Status   04/18/2024 33.1 31.5 - 35.7 g/dL Final   06/15/2020 32.7 31.0 - 37.0 g/dL Final     RDW   Date Value Ref Range Status   04/18/2024 13.1 12.3 - 15.4 % Final   06/15/2020 15.5 12.0 - 16.8 % Final     RDW-SD   Date Value Ref Range Status   04/18/2024 44.7 37.0 - 54.0 fl Final     MPV   Date Value Ref Range Status   04/18/2024 10.3 6.0 - 12.0 fL Final   06/15/2020 10.6 6.7 - 10.8 fL Final     Platelets   Date Value Ref Range Status   04/18/2024 214 140 - 450 10*3/mm3 Final   06/15/2020 186 140 - 440 10*3/uL Final     Neutrophil Rel %   Date Value Ref Range Status   06/15/2020 64.5 45 - 80 % Final     Neutrophil %   Date Value Ref Range Status   04/18/2024 57.4 42.7 - 76.0 % Final     Lymphocyte Rel %   Date Value Ref Range Status   06/15/2020 21.2 15 - 50 % Final     Lymphocyte %   Date Value Ref Range Status   04/18/2024 26.8 19.6 - 45.3 % Final     Monocyte Rel %   Date Value Ref Range Status   06/15/2020 10.1 0 - 15 % Final     Monocyte %   Date Value Ref Range Status   04/18/2024 10.5 5.0 - 12.0 % Final     Eosinophil %   Date Value Ref Range Status   04/18/2024 3.8 0.3 - 6.2 % Final   06/15/2020 3.4 0 - 7 % Final     Basophil Rel %   Date Value Ref Range Status   06/15/2020 0.5 0 - 2 % Final     Basophil %   Date Value Ref Range Status   04/18/2024 1.1 0.0 - 1.5 % Final     Immature Grans %   Date Value Ref Range Status   04/18/2024 0.4 0.0 - 0.5 % Final   06/15/2020 0.3 (H) 0 % Final     Neutrophils Absolute   Date Value Ref Range Status   06/15/2020 4.97 2.0 - 8.8 10*3/uL Final     Neutrophils, Absolute   Date Value Ref Range Status   04/18/2024 5.62 1.70 - 7.00 10*3/mm3 Final     Lymphocytes Absolute  "  Date Value Ref Range Status   06/15/2020 1.63 0.7 - 5.5 10*3/uL Final     Lymphocytes, Absolute   Date Value Ref Range Status   04/18/2024 2.62 0.70 - 3.10 10*3/mm3 Final     Monocytes Absolute   Date Value Ref Range Status   06/15/2020 0.78 0.0 - 1.7 10*3/uL Final     Monocytes, Absolute   Date Value Ref Range Status   04/18/2024 1.03 (H) 0.10 - 0.90 10*3/mm3 Final     Eosinophils Absolute   Date Value Ref Range Status   06/15/2020 0.26 0.0 - 0.8 10*3/uL Final     Eosinophils, Absolute   Date Value Ref Range Status   04/18/2024 0.37 0.00 - 0.40 10*3/mm3 Final     Basophils Absolute   Date Value Ref Range Status   06/15/2020 0.04 0.0 - 0.2 10*3/uL Final     Basophils, Absolute   Date Value Ref Range Status   04/18/2024 0.11 0.00 - 0.20 10*3/mm3 Final     Immature Grans, Absolute   Date Value Ref Range Status   04/18/2024 0.04 0.00 - 0.05 10*3/mm3 Final   06/15/2020 0.02 <1 10*3/uL Final     nRBC   Date Value Ref Range Status   04/18/2024 0.0 0.0 - 0.2 /100 WBC Final       No results found for: \"HGBA1C\"    No results found for: \"NISIOCEK44\"    No results found for: \"TSH\"    No results found for: \"CHOL\"  Lab Results   Component Value Date    TRIG 146 02/22/2024     Lab Results   Component Value Date    HDL 36 (L) 02/22/2024     Lab Results   Component Value Date    LDL 93 02/22/2024     Lab Results   Component Value Date    VLDL 26 02/22/2024     No results found for: \"LDLHDL\"      Procedures    Assessment & Plan   Problems Addressed this Visit       Coronary artery disease of native artery of native heart with stable angina pectoris - Primary    Relevant Orders    Lipid Panel With / Chol / HDL Ratio    Chronic fatigue    Relevant Orders    Comprehensive Metabolic Panel    TSH Rfx On Abnormal To Free T4    Vitamin B12    Testosterone    Low libido    Relevant Orders    Testosterone     Diagnoses         Codes Comments    Coronary artery disease of native artery of native heart with stable angina pectoris    -  " Primary ICD-10-CM: I25.118  ICD-9-CM: 414.01, 413.9     Low libido     ICD-10-CM: R68.82  ICD-9-CM: 799.81     Chronic fatigue     ICD-10-CM: R53.82  ICD-9-CM: 780.79         CAD.  Uncontrolled.  To cards for eval for stent.  Continue statin and aspirin and b blocker.    Low libido with fatigue.  Uncontrolled. Check CMP, testosterone, TSH, B12.  CBC normal recently.      Orders Placed This Encounter   Procedures    Comprehensive Metabolic Panel     Order Specific Question:   Release to patient     Answer:   Routine Release [[id number]]    Lipid Panel With / Chol / HDL Ratio     Order Specific Question:   Release to patient     Answer:   Routine Release [[id number]]    TSH Rfx On Abnormal To Free T4     Order Specific Question:   Release to patient     Answer:   Routine Release [[id number]]    Vitamin B12     Order Specific Question:   Release to patient     Answer:   Routine Release [[id number]]    Testosterone     Order Specific Question:   Release to patient     Answer:   Routine Release [[id number]]       Current Outpatient Medications   Medication Sig Dispense Refill    aspirin 81 MG EC tablet Take 1 tablet by mouth Daily.      atorvastatin (LIPITOR) 40 MG tablet Take 1 tablet by mouth Daily. 90 tablet 3    isosorbide mononitrate (IMDUR) 60 MG 24 hr tablet Take 1 tablet by mouth Daily. 30 tablet 5    metoprolol succinate XL (TOPROL-XL) 25 MG 24 hr tablet Take 1 tablet by mouth Daily. 30 tablet 5    nitroglycerin (NITROSTAT) 0.4 MG SL tablet Place 1 tablet under the tongue Every 5 (Five) Minutes As Needed for Chest Pain. Take no more than 3 doses in 15 minutes. 25 tablet 12     No current facility-administered medications for this visit.       Johnathan Olsen had no medications administered during this visit.    Return in about 4 months (around 11/29/2024).    There are no Patient Instructions on file for this visit.

## 2024-07-30 DIAGNOSIS — E78.2 MIXED HYPERLIPIDEMIA: ICD-10-CM

## 2024-07-30 LAB
ALBUMIN SERPL-MCNC: 4.6 G/DL (ref 3.8–4.9)
ALP SERPL-CCNC: 83 IU/L (ref 44–121)
ALT SERPL-CCNC: 41 IU/L (ref 0–44)
AST SERPL-CCNC: 29 IU/L (ref 0–40)
BILIRUB SERPL-MCNC: 0.7 MG/DL (ref 0–1.2)
BUN SERPL-MCNC: 16 MG/DL (ref 6–24)
BUN/CREAT SERPL: 14 (ref 9–20)
CALCIUM SERPL-MCNC: 9.5 MG/DL (ref 8.7–10.2)
CHLORIDE SERPL-SCNC: 101 MMOL/L (ref 96–106)
CHOLEST SERPL-MCNC: 147 MG/DL (ref 100–199)
CHOLEST/HDLC SERPL: 4 RATIO (ref 0–5)
CO2 SERPL-SCNC: 23 MMOL/L (ref 20–29)
CREAT SERPL-MCNC: 1.15 MG/DL (ref 0.76–1.27)
EGFRCR SERPLBLD CKD-EPI 2021: 73 ML/MIN/1.73
GLOBULIN SER CALC-MCNC: 2.7 G/DL (ref 1.5–4.5)
GLUCOSE SERPL-MCNC: 94 MG/DL (ref 70–99)
HDLC SERPL-MCNC: 37 MG/DL
LDLC SERPL CALC-MCNC: 79 MG/DL (ref 0–99)
POTASSIUM SERPL-SCNC: 4.6 MMOL/L (ref 3.5–5.2)
PROT SERPL-MCNC: 7.3 G/DL (ref 6–8.5)
SODIUM SERPL-SCNC: 140 MMOL/L (ref 134–144)
TESTOST SERPL-MCNC: 441 NG/DL (ref 264–916)
TRIGL SERPL-MCNC: 184 MG/DL (ref 0–149)
TSH SERPL DL<=0.005 MIU/L-ACNC: 2.22 UIU/ML (ref 0.45–4.5)
VIT B12 SERPL-MCNC: 630 PG/ML (ref 232–1245)
VLDLC SERPL CALC-MCNC: 31 MG/DL (ref 5–40)

## 2024-07-30 RX ORDER — ATORVASTATIN CALCIUM 80 MG/1
80 TABLET, FILM COATED ORAL DAILY
Qty: 90 TABLET | Refills: 3 | Status: SHIPPED | OUTPATIENT
Start: 2024-07-30

## 2024-08-20 ENCOUNTER — OFFICE VISIT (OUTPATIENT)
Dept: CARDIOLOGY | Facility: CLINIC | Age: 59
End: 2024-08-20
Payer: COMMERCIAL

## 2024-08-20 VITALS
DIASTOLIC BLOOD PRESSURE: 74 MMHG | HEART RATE: 69 BPM | HEIGHT: 70 IN | BODY MASS INDEX: 35.65 KG/M2 | WEIGHT: 249 LBS | SYSTOLIC BLOOD PRESSURE: 130 MMHG

## 2024-08-20 DIAGNOSIS — I49.1 APC (ATRIAL PREMATURE CONTRACTIONS): ICD-10-CM

## 2024-08-20 DIAGNOSIS — I25.118 CORONARY ARTERY DISEASE OF NATIVE ARTERY OF NATIVE HEART WITH STABLE ANGINA PECTORIS: ICD-10-CM

## 2024-08-20 DIAGNOSIS — I45.10 RBBB (RIGHT BUNDLE BRANCH BLOCK): Primary | ICD-10-CM

## 2024-08-20 DIAGNOSIS — E78.2 MIXED HYPERLIPIDEMIA: ICD-10-CM

## 2024-08-20 NOTE — PROGRESS NOTES
Subjective:     Encounter Date:08/20/2024      Patient ID: Johnathan Olsen is a 59 y.o. male.    Chief Complaint:  History of Present Illness  This is a 59-year-old male who follows with Dr. Dixon and is known to me.  He has a past medical history of hyperlipidemia, right bundle branch block, and premature atrial contractions.      He is here today for a follow up visit. He says over the last few weeks, he had some episodes of chest pain in the middle of his chest, similar to what he experienced in the past but not as severe. It is a dull pain and the episodes seems to be random. He moved his daughter to college over the weekend and had no symptoms while carrying furniture.  There is no radiation of his discomfort and no associated symptoms.  He did complete a home sleep study last night.  He denies any shortness of breath, palpitations or syncope.  He does have dizziness with standing at times.  No swelling in his legs, orthopnea or PND.     Prior history:  He saw Dr. Dixon for the first time in February 2024.  At that time he reported some chest discomfort located in the middle of his chest.  It was occurring with activity but did not occur every time that he was active.  His EKG at that office visit showed no acute changes.  His symptoms were concerning for angina.  He was already on aspirin and atorvastatin at that time.  Dr. Dixon started him on metoprolol 25 mg twice a day to see if this would help with his symptoms.  A stress test was performed that showed no evidence of ischemia.  An echocardiogram was also performed that was unremarkable.  The results were called to the patient, he reported that his chest discomfort was better with the beta-blocker but he was continue to have some discomfort in different from what he was experiencing before.  Cardiac catheterization was discussed with the patient and he wanted to hold off a little bit longer before proceeding with this.  He was then started on  isosorbide mononitrate 30 mg daily and asked to follow-up in 1 month.    He is here today for his 1 month follow-up.  He continues to have chest pain with exertion but this has improved with initiation of isosorbide mononitrate.  He says now he rates it a 2-3 out of 10.  The pain is on the left side of his chest but radiates into his neck and feels like a squeezing sensation.  He does feel little short of breath at times with exertion but never at rest.  He was able to walk in from the parking lot and from the waiting room with no shortness of breath.  He states he can climb a flight of steps without getting short of breath.  He denies any palpitations, dizziness or syncope.  He denies any swelling in his lower extremities, orthopnea, or PND.  Chest pain with exertion. Improved with imdur now a 2-3/10. Kind of leftside but more like a squeezing in neck.    Last few weeks had some episodes of chest pain in the middle of his chest. Dull pain similar to before but not as intense. Moved daughter to college and carried bed with no issues. This is occurring with no doing anything strenusou. Happened like 3 times in last 2 weeks and was very brief.. did home sleep study last night.     I have reviewed and updated as appropriate allergies, current medications, past family history, past medical history, past surgical history and problem list.    Review of Systems   Constitutional: Negative for fever, malaise/fatigue, weight gain and weight loss.   HENT:  Negative for congestion, hoarse voice and sore throat.    Eyes:  Negative for blurred vision and double vision.   Cardiovascular:  Positive for chest pain. Negative for dyspnea on exertion, leg swelling, orthopnea, palpitations and syncope.   Respiratory:  Negative for cough, shortness of breath and wheezing.    Musculoskeletal:  Negative for neck pain.   Gastrointestinal:  Negative for abdominal pain, hematemesis, hematochezia and melena.   Genitourinary:  Negative for  dysuria and hematuria.   Neurological:  Negative for dizziness, headaches, light-headedness and numbness.   Psychiatric/Behavioral:  Negative for depression. The patient is not nervous/anxious.          Current Outpatient Medications:     aspirin 81 MG EC tablet, Take 1 tablet by mouth Daily., Disp: , Rfl:     atorvastatin (LIPITOR) 80 MG tablet, Take 1 tablet by mouth Daily., Disp: 90 tablet, Rfl: 3    isosorbide mononitrate (IMDUR) 60 MG 24 hr tablet, Take 1 tablet by mouth Daily., Disp: 30 tablet, Rfl: 5    metoprolol succinate XL (TOPROL-XL) 25 MG 24 hr tablet, Take 1 tablet by mouth Daily., Disp: 30 tablet, Rfl: 5    nitroglycerin (NITROSTAT) 0.4 MG SL tablet, Place 1 tablet under the tongue Every 5 (Five) Minutes As Needed for Chest Pain. Take no more than 3 doses in 15 minutes., Disp: 25 tablet, Rfl: 12    Past Medical History:   Diagnosis Date    Hyperlipidemia        Past Surgical History:   Procedure Laterality Date    CARDIAC CATHETERIZATION N/A 4/29/2024    Procedure: Left Heart Cath;  Surgeon: Tracy Dixon MD;  Location: [facility] CATH INVASIVE LOCATION;  Service: Cardiology;  Laterality: N/A;    CARDIAC CATHETERIZATION N/A 4/29/2024    Procedure: Coronary angiography;  Surgeon: Tracy Dixon MD;  Location: [facility] CATH INVASIVE LOCATION;  Service: Cardiology;  Laterality: N/A;    TONSILLECTOMY         Family History   Problem Relation Age of Onset    Other Mother         ms    Heart disease Mother     Hyperlipidemia Mother     Cancer Father         lung, prostate       Social History     Tobacco Use    Smoking status: Former     Current packs/day: 1.00     Average packs/day: 1 pack/day for 40.5 years (40.5 ttl pk-yrs)     Types: Cigarettes     Start date: 2/11/1984    Smokeless tobacco: Never   Vaping Use    Vaping status: Never Used   Substance Use Topics    Alcohol use: Yes     Comment: socially    Drug use: Never         ECG 12 Lead    Date/Time: 8/20/2024 3:10 PM  Performed by: Fozia Garcia  "APRN    Authorized by: Fozia Garcia APRN  Comparison: compared with previous ECG from 5/16/2024  Similar to previous ECG  Rhythm: sinus rhythm  Conduction: incomplete right bundle branch block             Objective:     Visit Vitals  /74   Pulse 69   Ht 177.8 cm (70\")   Wt 113 kg (249 lb)   BMI 35.73 kg/m²             Physical Exam  Constitutional:       Appearance: Normal appearance. He is obese.   HENT:      Head: Normocephalic.   Neck:      Vascular: No carotid bruit.   Cardiovascular:      Rate and Rhythm: Normal rate and regular rhythm.      Chest Wall: PMI is not displaced.      Pulses: Normal pulses.           Radial pulses are 2+ on the right side and 2+ on the left side.        Posterior tibial pulses are 2+ on the right side and 2+ on the left side.      Heart sounds: Normal heart sounds. No murmur heard.     No friction rub. No gallop.   Pulmonary:      Effort: Pulmonary effort is normal.      Breath sounds: Normal breath sounds.   Abdominal:      General: Bowel sounds are normal. There is no distension.      Palpations: Abdomen is soft.   Musculoskeletal:      Right lower leg: No edema.      Left lower leg: No edema.   Skin:     General: Skin is warm and dry.      Capillary Refill: Capillary refill takes less than 2 seconds.   Neurological:      Mental Status: He is alert and oriented to person, place, and time.   Psychiatric:         Mood and Affect: Mood normal.         Behavior: Behavior normal.         Thought Content: Thought content normal.          Lab Review:   Lipid Panel          2/22/2024    14:05 7/29/2024    11:39   Lipid Panel   Total Cholesterol 155  147    Triglycerides 146  184    HDL Cholesterol 36  37    VLDL Cholesterol 26  31    LDL Cholesterol  93  79          Cardiac Procedures:       Assessment:         Diagnoses and all orders for this visit:    1. RBBB (right bundle branch block) (Primary)    2. Mixed hyperlipidemia    3. Coronary artery disease of native artery of " native heart with stable angina pectoris    4. APC (atrial premature contractions)    Other orders  -     ECG 12 Lead              Plan:       CAD:  of the RCA with collaterals. Patient is having complaints of angina but not as severe as before. D/W Dr. Dixon and with the patient. We talked about increasing his medical therapy. At this time, he is going to continue with current regimen and notify us if symptoms get worse. He will see Dr. Dixon in a couple of months to revisit possible intervention on .  HLD: on statin. Managed by Dr. Stratton  History of PACs: no complaints of palpitations. No PACs on EKG today.  Chronic incomplete RBBB    Thank you for allowing me to participate in this patient's care. Please call with any questions or concerns. Follow up with Dr. Dixon in 8 weeks.          Your medication list            Accurate as of August 20, 2024  3:11 PM. If you have any questions, ask your nurse or doctor.                CONTINUE taking these medications        Instructions Last Dose Given Next Dose Due   aspirin 81 MG EC tablet      Take 1 tablet by mouth Daily.       atorvastatin 80 MG tablet  Commonly known as: LIPITOR      Take 1 tablet by mouth Daily.       isosorbide mononitrate 60 MG 24 hr tablet  Commonly known as: IMDUR      Take 1 tablet by mouth Daily.       metoprolol succinate XL 25 MG 24 hr tablet  Commonly known as: TOPROL-XL      Take 1 tablet by mouth Daily.       nitroglycerin 0.4 MG SL tablet  Commonly known as: NITROSTAT      Place 1 tablet under the tongue Every 5 (Five) Minutes As Needed for Chest Pain. Take no more than 3 doses in 15 minutes.                  ADAM Perez  08/20/24  9:39 AM EDT

## 2024-10-21 ENCOUNTER — OFFICE VISIT (OUTPATIENT)
Dept: CARDIOLOGY | Facility: CLINIC | Age: 59
End: 2024-10-21
Payer: COMMERCIAL

## 2024-10-21 VITALS
SYSTOLIC BLOOD PRESSURE: 130 MMHG | WEIGHT: 250 LBS | BODY MASS INDEX: 35.79 KG/M2 | DIASTOLIC BLOOD PRESSURE: 70 MMHG | HEART RATE: 61 BPM | HEIGHT: 70 IN

## 2024-10-21 DIAGNOSIS — E78.2 MIXED HYPERLIPIDEMIA: ICD-10-CM

## 2024-10-21 DIAGNOSIS — I45.10 RBBB (RIGHT BUNDLE BRANCH BLOCK): Primary | ICD-10-CM

## 2024-10-21 DIAGNOSIS — I49.1 APC (ATRIAL PREMATURE CONTRACTIONS): ICD-10-CM

## 2024-10-21 DIAGNOSIS — I25.118 CORONARY ARTERY DISEASE OF NATIVE ARTERY OF NATIVE HEART WITH STABLE ANGINA PECTORIS: ICD-10-CM

## 2024-10-21 PROCEDURE — 93000 ELECTROCARDIOGRAM COMPLETE: CPT | Performed by: NURSE PRACTITIONER

## 2024-10-21 PROCEDURE — 99214 OFFICE O/P EST MOD 30 MIN: CPT | Performed by: NURSE PRACTITIONER

## 2024-10-21 NOTE — PROGRESS NOTES
Subjective:     Encounter Date:10/21/2024      Patient ID: Johnathan Olsen is a 59 y.o. male.    Chief Complaint:  History of Present Illness  This is a 59-year-old male who follows with Dr. Dixon and is known to me.  He has a past medical history of hyperlipidemia, right bundle branch block, and premature atrial contractions.     I saw him back in August for routine follow-up.  At that time he reported some episodes of chest pain similar to what he had experienced in the past but not as severe.  Discussed with Dr. Dixon who recommended titrating up on his antianginal medication.  I discussed this with the patient's and he wanted to just monitor his symptoms for the time being.    He is here today for follow-up visit.  Overall he feels about the same.  He is exercising about 5 days a week on the treadmill.  He is usually able to go for about 20 minutes and then stops due to developing some discomfort in his chest.  He recently started using his CPAP a couple of weeks ago.  He is working hard to be compliant with this.  He denies any shortness of breath, dizziness or syncope.  He has a little flutter here and there but nothing sustained in regards to palpitations as he has in the past.  Denies any swelling in his lower extremities, orthopnea or PND.  He is considering seeking a 2nd opinion with [facility] regarding intervention on the .     Prior history:  He saw Dr. Dixon for the first time in February 2024.  At that time he reported some chest discomfort located in the middle of his chest.  It was occurring with activity but did not occur every time that he was active.  His EKG at that office visit showed no acute changes.  His symptoms were concerning for angina.  He was already on aspirin and atorvastatin at that time.  Dr. Dixon started him on metoprolol 25 mg twice a day to see if this would help with his symptoms.  A stress test was performed that showed no evidence of ischemia.  An echocardiogram was also  performed that was unremarkable.  The results were called to the patient, he reported that his chest discomfort was better with the beta-blocker but he was continue to have some discomfort in different from what he was experiencing before.  Cardiac catheterization was discussed with the patient and he wanted to hold off a little bit longer before proceeding with this.  He was then started on isosorbide mononitrate 30 mg daily and asked to follow-up in 1 month.    I saw him in April 2024 for a 1 month follow-up and he reported improvement in his chest pain with initiation of isosorbide.  Despite improvement of his symptoms he wanted to go ahead and proceed with a cardiac catheterization.  This was performed on 4/29/2024 showed 100%  of the mid RCA with left-to-right collateral filling and otherwise nonobstructive disease of the left circumflex.  He was noted to have a normal left main and left anterior descending artery.  His isosorbide was increased to 30 mg twice a day.    At follow-up with Dr. Dixon in May he reported feeling better.  He was switched from metoprolol to tartrate to metoprolol succinate to ensure compliance.  No other changes were made.    I have reviewed and updated as appropriate allergies, current medications, past family history, past medical history, past surgical history and problem list.    Review of Systems   Constitutional: Negative for fever, malaise/fatigue, weight gain and weight loss.   HENT:  Negative for congestion, hoarse voice and sore throat.    Eyes:  Negative for blurred vision and double vision.   Cardiovascular:  Positive for chest pain. Negative for dyspnea on exertion, leg swelling, orthopnea, palpitations and syncope.   Respiratory:  Negative for cough, shortness of breath and wheezing.    Musculoskeletal:  Negative for neck pain.   Gastrointestinal:  Negative for abdominal pain, hematemesis, hematochezia and melena.   Genitourinary:  Negative for dysuria and hematuria.    Neurological:  Negative for dizziness, headaches, light-headedness and numbness.   Psychiatric/Behavioral:  Negative for depression. The patient is not nervous/anxious.          Current Outpatient Medications:     aspirin 81 MG EC tablet, Take 1 tablet by mouth Daily., Disp: , Rfl:     atorvastatin (LIPITOR) 80 MG tablet, Take 1 tablet by mouth Daily., Disp: 90 tablet, Rfl: 3    isosorbide mononitrate (IMDUR) 60 MG 24 hr tablet, Take 1 tablet by mouth Daily., Disp: 30 tablet, Rfl: 5    metoprolol succinate XL (TOPROL-XL) 25 MG 24 hr tablet, Take 1 tablet by mouth Daily., Disp: 30 tablet, Rfl: 5    nitroglycerin (NITROSTAT) 0.4 MG SL tablet, Place 1 tablet under the tongue Every 5 (Five) Minutes As Needed for Chest Pain. Take no more than 3 doses in 15 minutes., Disp: 25 tablet, Rfl: 12    Past Medical History:   Diagnosis Date    Hyperlipidemia        Past Surgical History:   Procedure Laterality Date    CARDIAC CATHETERIZATION N/A 4/29/2024    Procedure: Left Heart Cath;  Surgeon: Tracy Dixon MD;  Location: [facility] INVASIVE LOCATION;  Service: Cardiology;  Laterality: N/A;    CARDIAC CATHETERIZATION N/A 4/29/2024    Procedure: Coronary angiography;  Surgeon: Tracy Dixon MD;  Location: [facility] INVASIVE LOCATION;  Service: Cardiology;  Laterality: N/A;    TONSILLECTOMY         Family History   Problem Relation Age of Onset    Other Mother         ms    Heart disease Mother     Hyperlipidemia Mother     Cancer Father         lung, prostate       Social History     Tobacco Use    Smoking status: Former     Current packs/day: 1.00     Average packs/day: 1 pack/day for 40.7 years (40.7 ttl pk-yrs)     Types: Cigarettes     Start date: 2/11/1984    Smokeless tobacco: Never   Vaping Use    Vaping status: Never Used   Substance Use Topics    Alcohol use: Yes     Comment: socially    Drug use: Never         ECG 12 Lead    Date/Time: 10/21/2024 11:06 AM  Performed by: Fozia Garcia APRN    Authorized by:  "Fozia Garcia, APRN  Comparison: compared with previous ECG from 9/20/2024  Similar to previous ECG  Rhythm: sinus rhythm  Conduction: incomplete right bundle branch block             Objective:     Visit Vitals  /70 (BP Location: Right arm, Patient Position: Sitting, Cuff Size: Adult)   Pulse 61   Ht 177.8 cm (70\")   Wt 113 kg (250 lb)   BMI 35.87 kg/m²               Physical Exam  Constitutional:       Appearance: Normal appearance. He is obese.   HENT:      Head: Normocephalic.   Neck:      Vascular: No carotid bruit.   Cardiovascular:      Rate and Rhythm: Normal rate and regular rhythm.      Chest Wall: PMI is not displaced.      Pulses: Normal pulses.           Radial pulses are 2+ on the right side and 2+ on the left side.        Posterior tibial pulses are 2+ on the right side and 2+ on the left side.      Heart sounds: Normal heart sounds. No murmur heard.     No friction rub. No gallop.   Pulmonary:      Effort: Pulmonary effort is normal.      Breath sounds: Normal breath sounds.   Abdominal:      General: Bowel sounds are normal. There is no distension.      Palpations: Abdomen is soft.   Musculoskeletal:      Right lower leg: No edema.      Left lower leg: No edema.   Skin:     General: Skin is warm and dry.      Capillary Refill: Capillary refill takes less than 2 seconds.   Neurological:      Mental Status: He is alert and oriented to person, place, and time.   Psychiatric:         Mood and Affect: Mood normal.         Behavior: Behavior normal.         Thought Content: Thought content normal.          Lab Review:   Lipid Panel          2/22/2024    14:05 7/29/2024    11:39   Lipid Panel   Total Cholesterol 155  147    Triglycerides 146  184    HDL Cholesterol 36  37    VLDL Cholesterol 26  31    LDL Cholesterol  93  79          Cardiac Procedures:       Assessment:         Diagnoses and all orders for this visit:    1. RBBB (right bundle branch block) (Primary)    2. Mixed hyperlipidemia    3. " Coronary artery disease of native artery of native heart with stable angina pectoris    4. APC (atrial premature contractions)    Other orders  -     ECG 12 Lead              Plan:       CAD:  of the RCA with collaterals. His anginal symptoms appear to be stable overall on current dose of isosorbide. We discussed titrating up but he wants to remain at the same dose for now. He is planning to seek a second opinion at [facility] for intervention on the .   HLD: on statin. Managed by Dr. Stratton  History of PACs: no complaints of palpitations. No PACs on EKG today.  Chronic incomplete RBBB    Thank you for allowing me to participate in this patient's care. Please call with any questions or concerns. Follow up with Dr. Dixon will be determined after he considers the recommendations of [facility] cardiology.          Your medication list            Accurate as of October 21, 2024 11:08 AM. If you have any questions, ask your nurse or doctor.                CONTINUE taking these medications        Instructions Last Dose Given Next Dose Due   aspirin 81 MG EC tablet      Take 1 tablet by mouth Daily.       atorvastatin 80 MG tablet  Commonly known as: LIPITOR      Take 1 tablet by mouth Daily.       isosorbide mononitrate 60 MG 24 hr tablet  Commonly known as: IMDUR      Take 1 tablet by mouth Daily.       metoprolol succinate XL 25 MG 24 hr tablet  Commonly known as: TOPROL-XL      Take 1 tablet by mouth Daily.       nitroglycerin 0.4 MG SL tablet  Commonly known as: NITROSTAT      Place 1 tablet under the tongue Every 5 (Five) Minutes As Needed for Chest Pain. Take no more than 3 doses in 15 minutes.                  ADAM Perez  10/21/24  9:39 AM EDT

## 2024-11-08 RX ORDER — METOPROLOL SUCCINATE 25 MG/1
25 TABLET, EXTENDED RELEASE ORAL DAILY
Qty: 90 TABLET | Refills: 1 | Status: SHIPPED | OUTPATIENT
Start: 2024-11-08

## 2024-11-08 RX ORDER — ISOSORBIDE MONONITRATE 60 MG/1
60 TABLET, EXTENDED RELEASE ORAL DAILY
Qty: 90 TABLET | Refills: 1 | Status: SHIPPED | OUTPATIENT
Start: 2024-11-08

## 2024-11-27 ENCOUNTER — OFFICE VISIT (OUTPATIENT)
Dept: FAMILY MEDICINE CLINIC | Facility: CLINIC | Age: 59
End: 2024-11-27
Payer: COMMERCIAL

## 2024-11-27 VITALS
RESPIRATION RATE: 17 BRPM | TEMPERATURE: 97.3 F | WEIGHT: 248.8 LBS | SYSTOLIC BLOOD PRESSURE: 128 MMHG | BODY MASS INDEX: 35.62 KG/M2 | HEART RATE: 74 BPM | HEIGHT: 70 IN | OXYGEN SATURATION: 95 % | DIASTOLIC BLOOD PRESSURE: 72 MMHG

## 2024-11-27 DIAGNOSIS — I25.118 CORONARY ARTERY DISEASE OF NATIVE ARTERY OF NATIVE HEART WITH STABLE ANGINA PECTORIS: Primary | ICD-10-CM

## 2024-11-27 DIAGNOSIS — E78.2 MIXED HYPERLIPIDEMIA: ICD-10-CM

## 2024-11-27 PROCEDURE — 90677 PCV20 VACCINE IM: CPT | Performed by: FAMILY MEDICINE

## 2024-11-27 PROCEDURE — 90471 IMMUNIZATION ADMIN: CPT | Performed by: FAMILY MEDICINE

## 2024-11-27 PROCEDURE — 99214 OFFICE O/P EST MOD 30 MIN: CPT | Performed by: FAMILY MEDICINE

## 2024-11-27 RX ORDER — CLOPIDOGREL BISULFATE 75 MG/1
75 TABLET ORAL DAILY
Start: 2024-11-27

## 2024-11-27 RX ORDER — METOPROLOL SUCCINATE 25 MG/1
25 TABLET, EXTENDED RELEASE ORAL DAILY
Qty: 90 TABLET | Refills: 1 | Status: SHIPPED | OUTPATIENT
Start: 2024-11-27

## 2024-11-27 NOTE — PROGRESS NOTES
Chief Complaint   Patient presents with    Coronary Artery Disease    Hyperlipidemia       Subjective   Johnathan Olsen is a 59 y.o. male.     Coronary Artery Disease  Pertinent negatives include no chest pain. Risk factors include hyperlipidemia.   Hyperlipidemia  Pertinent negatives include no chest pain or myalgias.      FU CAD. Seeing Dr Macdonald with [facility].  Had Cath 11/15 and could not stent so will need repeat attempt after first of year per pt report.    F/U hyperlipidmeia.  On atorvastatin 80 once  aday.    The following portions of the patient's history were reviewed and updated as appropriate: allergies, current medications, past family history, past medical history, past social history, past surgical history and problem list.    Review of Systems   Constitutional:  Negative for chills, diaphoresis, fatigue and fever.   HENT:  Negative for congestion, sore throat and swollen glands.    Respiratory:  Negative for cough.    Cardiovascular:  Negative for chest pain.   Gastrointestinal:  Negative for abdominal pain, nausea and vomiting.   Genitourinary:  Negative for dysuria.   Musculoskeletal:  Negative for myalgias and neck pain.   Skin:  Negative for rash.   Neurological:  Negative for weakness and numbness.       Patient Active Problem List   Diagnosis    APC (atrial premature contractions)    Hyperlipidemia    RBBB (right bundle branch block)    Family history of prostate cancer    Colon cancer screening    Closed fracture of multiple ribs of left side with routine healing    Screening for prostate cancer    Encounter for hepatitis C screening test for low risk patient    Encounter for annual physical exam    Benign prostatic hyperplasia with nocturia    Exertional angina    Coronary artery disease of native artery of native heart with stable angina pectoris    Chronic fatigue    Low libido       Allergies   Allergen Reactions    Contrast Dye (Echo Or Unknown Ct/Mr) Hives         Current Outpatient  Medications:     aspirin 81 MG EC tablet, Take 1 tablet by mouth Daily., Disp: , Rfl:     atorvastatin (LIPITOR) 80 MG tablet, Take 1 tablet by mouth Daily., Disp: 90 tablet, Rfl: 3    isosorbide mononitrate (IMDUR) 60 MG 24 hr tablet, TAKE 1 TABLET BY MOUTH EVERY DAY, Disp: 90 tablet, Rfl: 1    metoprolol succinate XL (TOPROL-XL) 25 MG 24 hr tablet, TAKE 1 TABLET BY MOUTH EVERY DAY, Disp: 90 tablet, Rfl: 1    nitroglycerin (NITROSTAT) 0.4 MG SL tablet, Place 1 tablet under the tongue Every 5 (Five) Minutes As Needed for Chest Pain. Take no more than 3 doses in 15 minutes., Disp: 25 tablet, Rfl: 12    clopidogrel (Plavix) 75 MG tablet, Take 1 tablet by mouth Daily., Disp: , Rfl:     Past Medical History:   Diagnosis Date    Hyperlipidemia        Past Surgical History:   Procedure Laterality Date    CARDIAC CATHETERIZATION N/A 4/29/2024    Procedure: Left Heart Cath;  Surgeon: Tracy Dixon MD;  Location: [facility] CATH INVASIVE LOCATION;  Service: Cardiology;  Laterality: N/A;    CARDIAC CATHETERIZATION N/A 4/29/2024    Procedure: Coronary angiography;  Surgeon: Tracy Dixon MD;  Location: [facility] CATH INVASIVE LOCATION;  Service: Cardiology;  Laterality: N/A;    TONSILLECTOMY         Family History   Problem Relation Age of Onset    Other Mother         ms    Heart disease Mother     Hyperlipidemia Mother     Cancer Father         lung, prostate       Social History     Tobacco Use    Smoking status: Former     Current packs/day: 1.00     Average packs/day: 1 pack/day for 40.8 years (40.8 ttl pk-yrs)     Types: Cigarettes     Start date: 2/11/1984    Smokeless tobacco: Never   Substance Use Topics    Alcohol use: Yes     Comment: socially            Objective     Vitals:    11/27/24 0745   BP: 128/72   Pulse: 74   Resp: 17   Temp: 97.3 °F (36.3 °C)   SpO2: 95%     Body mass index is 35.7 kg/m².    Physical Exam  Vitals reviewed.   Constitutional:       Appearance: He is well-developed. He is not diaphoretic.    HENT:      Head: Normocephalic and atraumatic.   Eyes:      General: No scleral icterus.     Pupils: Pupils are equal, round, and reactive to light.   Neck:      Thyroid: No thyromegaly.   Cardiovascular:      Rate and Rhythm: Normal rate and regular rhythm.      Heart sounds: No murmur heard.     No friction rub. No gallop.   Pulmonary:      Effort: Pulmonary effort is normal. No respiratory distress.      Breath sounds: No wheezing or rales.   Chest:      Chest wall: No tenderness.   Abdominal:      General: Bowel sounds are normal. There is no distension.      Palpations: Abdomen is soft.      Tenderness: There is no abdominal tenderness.   Musculoskeletal:         General: No deformity. Normal range of motion.   Lymphadenopathy:      Cervical: No cervical adenopathy.   Skin:     General: Skin is warm and dry.      Findings: No rash.   Neurological:      Cranial Nerves: No cranial nerve deficit.      Motor: No abnormal muscle tone.         Lab Results   Component Value Date    GLUCOSE 94 07/29/2024    BUN 16 07/29/2024    CREATININE 1.15 07/29/2024    EGFRIFNONA 69 06/19/2020    EGFRIFAFRI 84 06/19/2020    BCR 14 07/29/2024    K 4.6 07/29/2024    CO2 23 07/29/2024    CALCIUM 9.5 07/29/2024    PROTENTOTREF 7.3 07/29/2024    ALBUMIN 4.6 07/29/2024    LABIL2 1.7 02/22/2024    AST 29 07/29/2024    ALT 41 07/29/2024       WBC   Date Value Ref Range Status   04/18/2024 9.79 3.40 - 10.80 10*3/mm3 Final   02/22/2024 9.0 3.4 - 10.8 x10E3/uL Final   06/15/2020 7.70 4.5 - 11.0 10*3/uL Final     RBC   Date Value Ref Range Status   04/18/2024 5.36 4.14 - 5.80 10*6/mm3 Final   02/22/2024 5.60 4.14 - 5.80 x10E6/uL Final   06/15/2020 5.00 4.5 - 5.9 10*6/uL Final     Hemoglobin   Date Value Ref Range Status   04/18/2024 16.2 13.0 - 17.7 g/dL Final   06/15/2020 14.7 13.5 - 17.5 g/dL Final     Hematocrit   Date Value Ref Range Status   04/18/2024 48.9 37.5 - 51.0 % Final   06/15/2020 44.9 41.0 - 53.0 % Final     MCV   Date Value  Ref Range Status   04/18/2024 91.2 79.0 - 97.0 fL Final   06/15/2020 89.8 80.0 - 100.0 fL Final     MCH   Date Value Ref Range Status   04/18/2024 30.2 26.6 - 33.0 pg Final   06/15/2020 29.4 26.0 - 34.0 pg Final     MCHC   Date Value Ref Range Status   04/18/2024 33.1 31.5 - 35.7 g/dL Final   06/15/2020 32.7 31.0 - 37.0 g/dL Final     RDW   Date Value Ref Range Status   04/18/2024 13.1 12.3 - 15.4 % Final   06/15/2020 15.5 12.0 - 16.8 % Final     RDW-SD   Date Value Ref Range Status   04/18/2024 44.7 37.0 - 54.0 fl Final     MPV   Date Value Ref Range Status   04/18/2024 10.3 6.0 - 12.0 fL Final   06/15/2020 10.6 6.7 - 10.8 fL Final     Platelets   Date Value Ref Range Status   04/18/2024 214 140 - 450 10*3/mm3 Final   06/15/2020 186 140 - 440 10*3/uL Final     Neutrophil Rel %   Date Value Ref Range Status   06/15/2020 64.5 45 - 80 % Final     Neutrophil %   Date Value Ref Range Status   04/18/2024 57.4 42.7 - 76.0 % Final     Lymphocyte Rel %   Date Value Ref Range Status   06/15/2020 21.2 15 - 50 % Final     Lymphocyte %   Date Value Ref Range Status   04/18/2024 26.8 19.6 - 45.3 % Final     Monocyte Rel %   Date Value Ref Range Status   06/15/2020 10.1 0 - 15 % Final     Monocyte %   Date Value Ref Range Status   04/18/2024 10.5 5.0 - 12.0 % Final     Eosinophil %   Date Value Ref Range Status   04/18/2024 3.8 0.3 - 6.2 % Final   06/15/2020 3.4 0 - 7 % Final     Basophil Rel %   Date Value Ref Range Status   06/15/2020 0.5 0 - 2 % Final     Basophil %   Date Value Ref Range Status   04/18/2024 1.1 0.0 - 1.5 % Final     Immature Grans %   Date Value Ref Range Status   04/18/2024 0.4 0.0 - 0.5 % Final   06/15/2020 0.3 (H) 0 % Final     Neutrophils Absolute   Date Value Ref Range Status   06/15/2020 4.97 2.0 - 8.8 10*3/uL Final     Neutrophils, Absolute   Date Value Ref Range Status   04/18/2024 5.62 1.70 - 7.00 10*3/mm3 Final     Lymphocytes Absolute   Date Value Ref Range Status   06/15/2020 1.63 0.7 - 5.5  "10*3/uL Final     Lymphocytes, Absolute   Date Value Ref Range Status   04/18/2024 2.62 0.70 - 3.10 10*3/mm3 Final     Monocytes Absolute   Date Value Ref Range Status   06/15/2020 0.78 0.0 - 1.7 10*3/uL Final     Monocytes, Absolute   Date Value Ref Range Status   04/18/2024 1.03 (H) 0.10 - 0.90 10*3/mm3 Final     Eosinophils Absolute   Date Value Ref Range Status   06/15/2020 0.26 0.0 - 0.8 10*3/uL Final     Eosinophils, Absolute   Date Value Ref Range Status   04/18/2024 0.37 0.00 - 0.40 10*3/mm3 Final     Basophils Absolute   Date Value Ref Range Status   06/15/2020 0.04 0.0 - 0.2 10*3/uL Final     Basophils, Absolute   Date Value Ref Range Status   04/18/2024 0.11 0.00 - 0.20 10*3/mm3 Final     Immature Grans, Absolute   Date Value Ref Range Status   04/18/2024 0.04 0.00 - 0.05 10*3/mm3 Final   06/15/2020 0.02 <1 10*3/uL Final     nRBC   Date Value Ref Range Status   04/18/2024 0.0 0.0 - 0.2 /100 WBC Final       No results found for: \"HGBA1C\"    Lab Results   Component Value Date    MRAPEPYQ96 630 07/29/2024       TSH   Date Value Ref Range Status   07/29/2024 2.220 0.450 - 4.500 uIU/mL Final       No results found for: \"CHOL\"  Lab Results   Component Value Date    TRIG 202 (H) 11/15/2024     Lab Results   Component Value Date    HDL 31 (L) 11/15/2024     Lab Results   Component Value Date    LDL 79 07/29/2024     Lab Results   Component Value Date    VLDL 31 07/29/2024     No results found for: \"LDLHDL\"      Procedures    Assessment & Plan   Problems Addressed this Visit       Hyperlipidemia    Coronary artery disease of native artery of native heart with stable angina pectoris - Primary    Relevant Medications    clopidogrel (Plavix) 75 MG tablet     Diagnoses         Codes Comments    Coronary artery disease of native artery of native heart with stable angina pectoris    -  Primary ICD-10-CM: I25.118  ICD-9-CM: 414.01, 413.9     Mixed hyperlipidemia     ICD-10-CM: E78.2  ICD-9-CM: 272.2           CAD. " Uncontrolled.  To cards.  Contnue Asa, B blocker, high dose statin.  Started on plavix with aspirin.  RF metoprolol.    Hyperlipidemia.  Uncontrolled.  Contnue atorva 80 a day.  Check FLP, CMP.      No orders of the defined types were placed in this encounter.      Current Outpatient Medications   Medication Sig Dispense Refill    aspirin 81 MG EC tablet Take 1 tablet by mouth Daily.      atorvastatin (LIPITOR) 80 MG tablet Take 1 tablet by mouth Daily. 90 tablet 3    isosorbide mononitrate (IMDUR) 60 MG 24 hr tablet TAKE 1 TABLET BY MOUTH EVERY DAY 90 tablet 1    metoprolol succinate XL (TOPROL-XL) 25 MG 24 hr tablet TAKE 1 TABLET BY MOUTH EVERY DAY 90 tablet 1    nitroglycerin (NITROSTAT) 0.4 MG SL tablet Place 1 tablet under the tongue Every 5 (Five) Minutes As Needed for Chest Pain. Take no more than 3 doses in 15 minutes. 25 tablet 12    clopidogrel (Plavix) 75 MG tablet Take 1 tablet by mouth Daily.       No current facility-administered medications for this visit.       Johnathan Olsen had no medications administered during this visit.    No follow-ups on file.    There are no Patient Instructions on file for this visit.

## 2024-11-28 LAB
ALBUMIN SERPL-MCNC: 3.7 G/DL (ref 3.5–5.2)
ALBUMIN/GLOB SERPL: 1.4 G/DL
ALP SERPL-CCNC: 86 U/L (ref 39–117)
ALT SERPL-CCNC: 29 U/L (ref 1–41)
AST SERPL-CCNC: 20 U/L (ref 1–40)
BILIRUB SERPL-MCNC: 0.4 MG/DL (ref 0–1.2)
BUN SERPL-MCNC: 12 MG/DL (ref 6–20)
BUN/CREAT SERPL: 11.8 (ref 7–25)
CALCIUM SERPL-MCNC: 8.7 MG/DL (ref 8.6–10.5)
CHLORIDE SERPL-SCNC: 101 MMOL/L (ref 98–107)
CHOLEST SERPL-MCNC: 106 MG/DL (ref 0–200)
CHOLEST/HDLC SERPL: 4.08 {RATIO}
CO2 SERPL-SCNC: 25.3 MMOL/L (ref 22–29)
CREAT SERPL-MCNC: 1.02 MG/DL (ref 0.76–1.27)
EGFRCR SERPLBLD CKD-EPI 2021: 84.7 ML/MIN/1.73
GLOBULIN SER CALC-MCNC: 2.6 GM/DL
GLUCOSE SERPL-MCNC: 88 MG/DL (ref 65–99)
HDLC SERPL-MCNC: 26 MG/DL (ref 40–60)
LDLC SERPL CALC-MCNC: 59 MG/DL (ref 0–100)
POTASSIUM SERPL-SCNC: 4.5 MMOL/L (ref 3.5–5.2)
PROT SERPL-MCNC: 6.3 G/DL (ref 6–8.5)
SODIUM SERPL-SCNC: 139 MMOL/L (ref 136–145)
TRIGL SERPL-MCNC: 110 MG/DL (ref 0–150)
VLDLC SERPL CALC-MCNC: 21 MG/DL (ref 5–40)

## 2024-12-23 ENCOUNTER — TELEPHONE (OUTPATIENT)
Dept: FAMILY MEDICINE CLINIC | Facility: CLINIC | Age: 59
End: 2024-12-23
Payer: COMMERCIAL

## 2024-12-23 DIAGNOSIS — E78.2 MIXED HYPERLIPIDEMIA: ICD-10-CM

## 2024-12-23 RX ORDER — ATORVASTATIN CALCIUM 80 MG/1
80 TABLET, FILM COATED ORAL DAILY
Qty: 90 TABLET | Refills: 3 | Status: SHIPPED | OUTPATIENT
Start: 2024-12-23

## 2025-02-12 DIAGNOSIS — E78.2 MIXED HYPERLIPIDEMIA: ICD-10-CM

## 2025-02-13 RX ORDER — ATORVASTATIN CALCIUM 80 MG/1
80 TABLET, FILM COATED ORAL DAILY
Qty: 90 TABLET | Refills: 3 | Status: SHIPPED | OUTPATIENT
Start: 2025-02-13

## 2025-04-09 ENCOUNTER — TRANSCRIBE ORDERS (OUTPATIENT)
Dept: CARDIAC REHAB | Facility: HOSPITAL | Age: 60
End: 2025-04-09
Payer: COMMERCIAL

## 2025-04-09 ENCOUNTER — OFFICE VISIT (OUTPATIENT)
Dept: FAMILY MEDICINE CLINIC | Facility: CLINIC | Age: 60
End: 2025-04-09
Payer: COMMERCIAL

## 2025-04-09 VITALS
DIASTOLIC BLOOD PRESSURE: 82 MMHG | HEIGHT: 70 IN | HEART RATE: 78 BPM | TEMPERATURE: 97.6 F | BODY MASS INDEX: 37.08 KG/M2 | SYSTOLIC BLOOD PRESSURE: 124 MMHG | RESPIRATION RATE: 17 BRPM | WEIGHT: 259 LBS | OXYGEN SATURATION: 98 %

## 2025-04-09 DIAGNOSIS — I20.89 EXERTIONAL ANGINA: ICD-10-CM

## 2025-04-09 DIAGNOSIS — N40.0 BENIGN PROSTATIC HYPERPLASIA WITHOUT LOWER URINARY TRACT SYMPTOMS: ICD-10-CM

## 2025-04-09 DIAGNOSIS — I25.118 CORONARY ARTERY DISEASE OF NATIVE ARTERY OF NATIVE HEART WITH STABLE ANGINA PECTORIS: ICD-10-CM

## 2025-04-09 DIAGNOSIS — Z95.5 STATUS POST INSERTION OF DRUG-ELUTING STENT INTO RIGHT CORONARY ARTERY FOR CORONARY ARTERY DISEASE: Primary | ICD-10-CM

## 2025-04-09 DIAGNOSIS — E78.2 MIXED HYPERLIPIDEMIA: ICD-10-CM

## 2025-04-09 DIAGNOSIS — Z00.00 ENCOUNTER FOR ANNUAL PHYSICAL EXAM: Primary | ICD-10-CM

## 2025-04-09 RX ORDER — METOPROLOL SUCCINATE 25 MG/1
25 TABLET, EXTENDED RELEASE ORAL DAILY
Qty: 90 TABLET | Refills: 3 | Status: SHIPPED | OUTPATIENT
Start: 2025-04-09

## 2025-04-09 RX ORDER — CLOPIDOGREL BISULFATE 75 MG/1
75 TABLET ORAL DAILY
Qty: 90 TABLET | Refills: 3 | Status: SHIPPED | OUTPATIENT
Start: 2025-04-09

## 2025-04-09 RX ORDER — NITROGLYCERIN 0.4 MG/1
0.4 TABLET SUBLINGUAL
Qty: 25 TABLET | Refills: 12 | Status: SHIPPED | OUTPATIENT
Start: 2025-04-09

## 2025-04-09 NOTE — PROGRESS NOTES
Patient here for annual physical exam    Subjective   Johnathan Olsen is a 60 y.o. male.     History of Present Illness   59 yo WM here for annual.    The following portions of the patient's history were reviewed and updated as appropriate: allergies, current medications, past family history, past medical history, past social history, past surgical history and problem list    Cologuard 2024.   Dentist: malini.    Optometry:utd  Last PSA(if applicable):needed.  Last mammo(if applicable):na    F/U CAD.  Had PCI 3/25/24.  Doing well with DAPT therapy.  Starting CV rehab.     Immunization History   Administered Date(s) Administered    COVID-19 (PFIZER) Purple Cap Monovalent 03/24/2021, 04/21/2021, 12/27/2021    Fluzone (or Fluarix & Flulaval for VFC) >6mos 12/27/2021    Pneumococcal Conjugate 20-Valent (PCV20) 11/27/2024    Tdap 02/22/2024       Review of Systems   Constitutional:  Negative for appetite change and fatigue.   HENT:  Negative for nosebleeds and sore throat.    Eyes:  Negative for blurred vision and visual disturbance.   Respiratory:  Negative for shortness of breath and wheezing.    Cardiovascular:  Negative for chest pain and leg swelling.   Gastrointestinal:  Negative for abdominal distention and abdominal pain.   Endocrine: Negative for cold intolerance and polyuria.   Genitourinary:  Negative for dysuria and hematuria.   Musculoskeletal:  Negative for arthralgias and myalgias.   Skin:  Negative for color change and rash.   Neurological:  Negative for weakness and confusion.   Psychiatric/Behavioral:  Negative for agitation and depressed mood.        Patient Active Problem List   Diagnosis    APC (atrial premature contractions)    Hyperlipidemia    RBBB (right bundle branch block)    Family history of prostate cancer    Colon cancer screening    Closed fracture of multiple ribs of left side with routine healing    Screening for prostate cancer    Encounter for hepatitis C screening test for low risk  patient    Encounter for annual physical exam    Benign prostatic hyperplasia with nocturia    Exertional angina    Coronary artery disease of native artery of native heart with stable angina pectoris    Chronic fatigue    Low libido       Allergies   Allergen Reactions    Contrast Dye (Echo Or Unknown Ct/Mr) Hives    Grass Rash         Current Outpatient Medications:     aspirin 81 MG EC tablet, Take 1 tablet by mouth Daily., Disp: , Rfl:     atorvastatin (LIPITOR) 80 MG tablet, Take 1 tablet by mouth Daily., Disp: 90 tablet, Rfl: 3    clopidogrel (Plavix) 75 MG tablet, Take 1 tablet by mouth Daily., Disp: 90 tablet, Rfl: 3    isosorbide mononitrate (IMDUR) 60 MG 24 hr tablet, TAKE 1 TABLET BY MOUTH EVERY DAY, Disp: 90 tablet, Rfl: 1    metoprolol succinate XL (TOPROL-XL) 25 MG 24 hr tablet, Take 1 tablet by mouth Daily., Disp: 90 tablet, Rfl: 3    nitroglycerin (NITROSTAT) 0.4 MG SL tablet, Place 1 tablet under the tongue Every 5 (Five) Minutes As Needed for Chest Pain. Take no more than 3 doses in 15 minutes., Disp: 25 tablet, Rfl: 12    Past Medical History:   Diagnosis Date    Hyperlipidemia        Past Surgical History:   Procedure Laterality Date    CARDIAC CATHETERIZATION N/A 4/29/2024    Procedure: Left Heart Cath;  Surgeon: Tracy Dixon MD;  Location: [facility] CATH INVASIVE LOCATION;  Service: Cardiology;  Laterality: N/A;    CARDIAC CATHETERIZATION N/A 4/29/2024    Procedure: Coronary angiography;  Surgeon: Tracy Dixon MD;  Location: [facility] CATH INVASIVE LOCATION;  Service: Cardiology;  Laterality: N/A;    TONSILLECTOMY         Family History   Problem Relation Age of Onset    Other Mother         ms    Heart disease Mother     Hyperlipidemia Mother     Cancer Father         lung, prostate       Social History     Tobacco Use    Smoking status: Former     Current packs/day: 1.00     Average packs/day: 1 pack/day for 41.2 years (41.2 ttl pk-yrs)     Types: Cigarettes     Start date: 2/11/1984     Smokeless tobacco: Never   Substance Use Topics    Alcohol use: Yes     Comment: socially            Objective     Vitals:    04/09/25 0855   BP: 124/82   Pulse: 78   Resp: 17   Temp: 97.6 °F (36.4 °C)   SpO2: 98%     Body mass index is 37.16 kg/m².    Physical Exam  Vitals reviewed.   Constitutional:       Appearance: He is well-developed. He is not diaphoretic.   HENT:      Head: Normocephalic and atraumatic.   Eyes:      General: No scleral icterus.     Pupils: Pupils are equal, round, and reactive to light.   Neck:      Thyroid: No thyromegaly.   Cardiovascular:      Rate and Rhythm: Normal rate and regular rhythm.      Heart sounds: No murmur heard.     No friction rub. No gallop.   Pulmonary:      Effort: Pulmonary effort is normal. No respiratory distress.      Breath sounds: No wheezing or rales.   Chest:      Chest wall: No tenderness.   Abdominal:      General: Bowel sounds are normal. There is no distension.      Palpations: Abdomen is soft.      Tenderness: There is no abdominal tenderness.   Musculoskeletal:         General: No deformity. Normal range of motion.   Lymphadenopathy:      Cervical: No cervical adenopathy.   Skin:     General: Skin is warm and dry.      Findings: No rash.   Neurological:      Cranial Nerves: No cranial nerve deficit.      Motor: No abnormal muscle tone.         Lab Results   Component Value Date    GLUCOSE 88 11/27/2024    BUN 12 11/27/2024    CREATININE 1.02 11/27/2024    EGFRIFNONA 69 06/19/2020    EGFRIFAFRI 84 06/19/2020    BCR 11.8 11/27/2024    K 4.5 11/27/2024    CO2 25.3 11/27/2024    CALCIUM 8.7 11/27/2024    ALBUMIN 3.7 11/27/2024    LABIL2 1.3 06/15/2020    AST 20 11/27/2024    ALT 29 11/27/2024       WBC   Date Value Ref Range Status   04/18/2024 9.79 3.40 - 10.80 10*3/mm3 Final   02/22/2024 9.0 3.4 - 10.8 x10E3/uL Final   06/15/2020 7.70 4.5 - 11.0 10*3/uL Final     RBC   Date Value Ref Range Status   04/18/2024 5.36 4.14 - 5.80 10*6/mm3 Final   02/22/2024 5.60  4.14 - 5.80 x10E6/uL Final   06/15/2020 5.00 4.5 - 5.9 10*6/uL Final     Hemoglobin   Date Value Ref Range Status   04/18/2024 16.2 13.0 - 17.7 g/dL Final   06/15/2020 14.7 13.5 - 17.5 g/dL Final     Hematocrit   Date Value Ref Range Status   04/18/2024 48.9 37.5 - 51.0 % Final   06/15/2020 44.9 41.0 - 53.0 % Final     MCV   Date Value Ref Range Status   04/18/2024 91.2 79.0 - 97.0 fL Final   06/15/2020 89.8 80.0 - 100.0 fL Final     MCH   Date Value Ref Range Status   04/18/2024 30.2 26.6 - 33.0 pg Final   06/15/2020 29.4 26.0 - 34.0 pg Final     MCHC   Date Value Ref Range Status   04/18/2024 33.1 31.5 - 35.7 g/dL Final   06/15/2020 32.7 31.0 - 37.0 g/dL Final     RDW   Date Value Ref Range Status   04/18/2024 13.1 12.3 - 15.4 % Final   06/15/2020 15.5 12.0 - 16.8 % Final     RDW-SD   Date Value Ref Range Status   04/18/2024 44.7 37.0 - 54.0 fl Final     MPV   Date Value Ref Range Status   04/18/2024 10.3 6.0 - 12.0 fL Final   06/15/2020 10.6 6.7 - 10.8 fL Final     Platelets   Date Value Ref Range Status   04/18/2024 214 140 - 450 10*3/mm3 Final   06/15/2020 186 140 - 440 10*3/uL Final     Neutrophil Rel %   Date Value Ref Range Status   06/15/2020 64.5 45 - 80 % Final     Neutrophil %   Date Value Ref Range Status   04/18/2024 57.4 42.7 - 76.0 % Final     Lymphocyte Rel %   Date Value Ref Range Status   06/15/2020 21.2 15 - 50 % Final     Lymphocyte %   Date Value Ref Range Status   04/18/2024 26.8 19.6 - 45.3 % Final     Monocyte Rel %   Date Value Ref Range Status   06/15/2020 10.1 0 - 15 % Final     Monocyte %   Date Value Ref Range Status   04/18/2024 10.5 5.0 - 12.0 % Final     Eosinophil %   Date Value Ref Range Status   04/18/2024 3.8 0.3 - 6.2 % Final   06/15/2020 3.4 0 - 7 % Final     Basophil Rel %   Date Value Ref Range Status   06/15/2020 0.5 0 - 2 % Final     Basophil %   Date Value Ref Range Status   04/18/2024 1.1 0.0 - 1.5 % Final     Immature Grans %   Date Value Ref Range Status   04/18/2024  "0.4 0.0 - 0.5 % Final   06/15/2020 0.3 (H) 0 % Final     Neutrophils Absolute   Date Value Ref Range Status   06/15/2020 4.97 2.0 - 8.8 10*3/uL Final     Neutrophils, Absolute   Date Value Ref Range Status   04/18/2024 5.62 1.70 - 7.00 10*3/mm3 Final     Lymphocytes Absolute   Date Value Ref Range Status   06/15/2020 1.63 0.7 - 5.5 10*3/uL Final     Lymphocytes, Absolute   Date Value Ref Range Status   04/18/2024 2.62 0.70 - 3.10 10*3/mm3 Final     Monocytes Absolute   Date Value Ref Range Status   06/15/2020 0.78 0.0 - 1.7 10*3/uL Final     Monocytes, Absolute   Date Value Ref Range Status   04/18/2024 1.03 (H) 0.10 - 0.90 10*3/mm3 Final     Eosinophils Absolute   Date Value Ref Range Status   06/15/2020 0.26 0.0 - 0.8 10*3/uL Final     Eosinophils, Absolute   Date Value Ref Range Status   04/18/2024 0.37 0.00 - 0.40 10*3/mm3 Final     Basophils Absolute   Date Value Ref Range Status   06/15/2020 0.04 0.0 - 0.2 10*3/uL Final     Basophils, Absolute   Date Value Ref Range Status   04/18/2024 0.11 0.00 - 0.20 10*3/mm3 Final     Immature Grans, Absolute   Date Value Ref Range Status   04/18/2024 0.04 0.00 - 0.05 10*3/mm3 Final   06/15/2020 0.02 <1 10*3/uL Final     nRBC   Date Value Ref Range Status   04/18/2024 0.0 0.0 - 0.2 /100 WBC Final       No results found for: \"HGBA1C\"    Lab Results   Component Value Date    [id number] 630 07/29/2024       TSH   Date Value Ref Range Status   07/29/2024 2.220 0.450 - 4.500 uIU/mL Final       No results found for: \"CHOL\"  Lab Results   Component Value Date    TRIG 287 (H) 03/25/2025     Lab Results   Component Value Date    HDL 32 (L) 03/25/2025     Lab Results   Component Value Date    LDL 59 11/27/2024     Lab Results   Component Value Date    VLDL 21 11/27/2024     Lab Results   Component Value Date    LDLHDL 0.78 03/25/2025         Procedures    Assessment & Plan   Problems Addressed this Visit       Hyperlipidemia    Encounter for annual physical exam - Primary    " Exertional angina    Relevant Medications    clopidogrel (Plavix) 75 MG tablet    metoprolol succinate XL (TOPROL-XL) 25 MG 24 hr tablet    nitroglycerin (NITROSTAT) 0.4 MG SL tablet    Coronary artery disease of native artery of native heart with stable angina pectoris    Relevant Medications    clopidogrel (Plavix) 75 MG tablet    metoprolol succinate XL (TOPROL-XL) 25 MG 24 hr tablet    nitroglycerin (NITROSTAT) 0.4 MG SL tablet     Other Visit Diagnoses         Benign prostatic hyperplasia without lower urinary tract symptoms        Relevant Orders    PSA DIAGNOSTIC          Diagnoses         Codes Comments      Encounter for annual physical exam    -  Primary ICD-10-CM: Z00.00  ICD-9-CM: V70.0       Exertional angina     ICD-10-CM: I20.89  ICD-9-CM: 413.9       Coronary artery disease of native artery of native heart with stable angina pectoris     ICD-10-CM: I25.118  ICD-9-CM: 414.01, 413.9       Mixed hyperlipidemia     ICD-10-CM: E78.2  ICD-9-CM: 272.2       Benign prostatic hyperplasia without lower urinary tract symptoms     ICD-10-CM: N40.0  ICD-9-CM: 600.00         CAD.  Controlled.  LDL at goal.  Contnue high dose statin.  RF plavix today.  Contnue aspirin.    Hyperlipidmeia.  LDL at goal from 3/25.  Continue statin.      Preventive Counseling:  Encouraged to stay active.  Tdap utd.    Pneumovax UTD.  Cologuard utd.    Dentist UTD.  Optho UTD.  Check PSA.      Orders Placed This Encounter   Procedures    PSA DIAGNOSTIC     Release to patient:   Routine Release [[id number]]       Current Outpatient Medications   Medication Sig Dispense Refill    aspirin 81 MG EC tablet Take 1 tablet by mouth Daily.      atorvastatin (LIPITOR) 80 MG tablet Take 1 tablet by mouth Daily. 90 tablet 3    clopidogrel (Plavix) 75 MG tablet Take 1 tablet by mouth Daily. 90 tablet 3    isosorbide mononitrate (IMDUR) 60 MG 24 hr tablet TAKE 1 TABLET BY MOUTH EVERY DAY 90 tablet 1    metoprolol succinate XL (TOPROL-XL) 25 MG 24 hr  tablet Take 1 tablet by mouth Daily. 90 tablet 3    nitroglycerin (NITROSTAT) 0.4 MG SL tablet Place 1 tablet under the tongue Every 5 (Five) Minutes As Needed for Chest Pain. Take no more than 3 doses in 15 minutes. 25 tablet 12     No current facility-administered medications for this visit.       Return in about 6 months (around 10/9/2025).    There are no Patient Instructions on file for this visit.

## 2025-04-10 LAB — PSA SERPL-MCNC: 0.46 NG/ML (ref 0–4)

## 2025-04-15 ENCOUNTER — OFFICE VISIT (OUTPATIENT)
Dept: CARDIAC REHAB | Facility: HOSPITAL | Age: 60
End: 2025-04-15
Payer: COMMERCIAL

## 2025-04-15 DIAGNOSIS — Z95.5 STATUS POST INSERTION OF DRUG-ELUTING STENT INTO RIGHT CORONARY ARTERY FOR CORONARY ARTERY DISEASE: Primary | ICD-10-CM

## 2025-04-15 DIAGNOSIS — Z98.61 CAD S/P PERCUTANEOUS CORONARY ANGIOPLASTY: ICD-10-CM

## 2025-04-15 DIAGNOSIS — I25.10 CAD S/P PERCUTANEOUS CORONARY ANGIOPLASTY: ICD-10-CM

## 2025-04-15 PROCEDURE — 93798 PHYS/QHP OP CAR RHAB W/ECG: CPT

## 2025-04-15 PROCEDURE — 93797 PHYS/QHP OP CAR RHAB WO ECG: CPT

## 2025-04-18 ENCOUNTER — TREATMENT (OUTPATIENT)
Dept: CARDIAC REHAB | Facility: HOSPITAL | Age: 60
End: 2025-04-18
Payer: COMMERCIAL

## 2025-04-18 DIAGNOSIS — Z95.5 STATUS POST INSERTION OF DRUG-ELUTING STENT INTO RIGHT CORONARY ARTERY FOR CORONARY ARTERY DISEASE: Primary | ICD-10-CM

## 2025-04-18 PROCEDURE — 93798 PHYS/QHP OP CAR RHAB W/ECG: CPT

## 2025-04-21 ENCOUNTER — TREATMENT (OUTPATIENT)
Dept: CARDIAC REHAB | Facility: HOSPITAL | Age: 60
End: 2025-04-21
Payer: COMMERCIAL

## 2025-04-21 DIAGNOSIS — Z95.5 STATUS POST INSERTION OF DRUG-ELUTING STENT INTO RIGHT CORONARY ARTERY FOR CORONARY ARTERY DISEASE: Primary | ICD-10-CM

## 2025-04-21 PROCEDURE — 93798 PHYS/QHP OP CAR RHAB W/ECG: CPT

## 2025-04-23 ENCOUNTER — TREATMENT (OUTPATIENT)
Dept: CARDIAC REHAB | Facility: HOSPITAL | Age: 60
End: 2025-04-23
Payer: COMMERCIAL

## 2025-04-23 DIAGNOSIS — Z95.5 STATUS POST INSERTION OF DRUG-ELUTING STENT INTO RIGHT CORONARY ARTERY FOR CORONARY ARTERY DISEASE: Primary | ICD-10-CM

## 2025-04-23 PROCEDURE — 93798 PHYS/QHP OP CAR RHAB W/ECG: CPT

## 2025-04-25 ENCOUNTER — TREATMENT (OUTPATIENT)
Dept: CARDIAC REHAB | Facility: HOSPITAL | Age: 60
End: 2025-04-25
Payer: COMMERCIAL

## 2025-04-25 DIAGNOSIS — Z95.5 STATUS POST INSERTION OF DRUG-ELUTING STENT INTO RIGHT CORONARY ARTERY FOR CORONARY ARTERY DISEASE: Primary | ICD-10-CM

## 2025-04-25 PROCEDURE — 93798 PHYS/QHP OP CAR RHAB W/ECG: CPT

## 2025-04-28 ENCOUNTER — TREATMENT (OUTPATIENT)
Dept: CARDIAC REHAB | Facility: HOSPITAL | Age: 60
End: 2025-04-28
Payer: COMMERCIAL

## 2025-04-28 DIAGNOSIS — Z98.61 CAD S/P PERCUTANEOUS CORONARY ANGIOPLASTY: Primary | ICD-10-CM

## 2025-04-28 DIAGNOSIS — I25.10 CAD S/P PERCUTANEOUS CORONARY ANGIOPLASTY: Primary | ICD-10-CM

## 2025-04-28 PROCEDURE — 93798 PHYS/QHP OP CAR RHAB W/ECG: CPT

## 2025-04-30 ENCOUNTER — TREATMENT (OUTPATIENT)
Dept: CARDIAC REHAB | Facility: HOSPITAL | Age: 60
End: 2025-04-30
Payer: COMMERCIAL

## 2025-04-30 DIAGNOSIS — I25.10 CAD S/P PERCUTANEOUS CORONARY ANGIOPLASTY: Primary | ICD-10-CM

## 2025-04-30 DIAGNOSIS — Z98.61 CAD S/P PERCUTANEOUS CORONARY ANGIOPLASTY: Primary | ICD-10-CM

## 2025-04-30 PROCEDURE — 93798 PHYS/QHP OP CAR RHAB W/ECG: CPT

## 2025-05-02 ENCOUNTER — TREATMENT (OUTPATIENT)
Dept: CARDIAC REHAB | Facility: HOSPITAL | Age: 60
End: 2025-05-02
Payer: COMMERCIAL

## 2025-05-02 DIAGNOSIS — I25.10 CAD S/P PERCUTANEOUS CORONARY ANGIOPLASTY: Primary | ICD-10-CM

## 2025-05-02 DIAGNOSIS — Z98.61 CAD S/P PERCUTANEOUS CORONARY ANGIOPLASTY: Primary | ICD-10-CM

## 2025-05-02 PROCEDURE — 93798 PHYS/QHP OP CAR RHAB W/ECG: CPT

## 2025-05-02 RX ORDER — ISOSORBIDE MONONITRATE 60 MG/1
60 TABLET, EXTENDED RELEASE ORAL DAILY
Qty: 90 TABLET | Refills: 1 | Status: SHIPPED | OUTPATIENT
Start: 2025-05-02

## 2025-05-05 ENCOUNTER — TREATMENT (OUTPATIENT)
Dept: CARDIAC REHAB | Facility: HOSPITAL | Age: 60
End: 2025-05-05
Payer: COMMERCIAL

## 2025-05-05 DIAGNOSIS — Z98.61 CAD S/P PERCUTANEOUS CORONARY ANGIOPLASTY: Primary | ICD-10-CM

## 2025-05-05 DIAGNOSIS — I25.10 CAD S/P PERCUTANEOUS CORONARY ANGIOPLASTY: Primary | ICD-10-CM

## 2025-05-05 PROCEDURE — 93798 PHYS/QHP OP CAR RHAB W/ECG: CPT

## 2025-05-07 ENCOUNTER — TREATMENT (OUTPATIENT)
Dept: CARDIAC REHAB | Facility: HOSPITAL | Age: 60
End: 2025-05-07
Payer: COMMERCIAL

## 2025-05-07 DIAGNOSIS — Z98.61 CAD S/P PERCUTANEOUS CORONARY ANGIOPLASTY: Primary | ICD-10-CM

## 2025-05-07 DIAGNOSIS — I25.10 CAD S/P PERCUTANEOUS CORONARY ANGIOPLASTY: Primary | ICD-10-CM

## 2025-05-07 PROCEDURE — 93798 PHYS/QHP OP CAR RHAB W/ECG: CPT

## 2025-05-09 ENCOUNTER — TREATMENT (OUTPATIENT)
Dept: CARDIAC REHAB | Facility: HOSPITAL | Age: 60
End: 2025-05-09
Payer: COMMERCIAL

## 2025-05-09 DIAGNOSIS — Z98.61 CAD S/P PERCUTANEOUS CORONARY ANGIOPLASTY: Primary | ICD-10-CM

## 2025-05-09 DIAGNOSIS — I25.10 CAD S/P PERCUTANEOUS CORONARY ANGIOPLASTY: Primary | ICD-10-CM

## 2025-05-09 PROCEDURE — 93798 PHYS/QHP OP CAR RHAB W/ECG: CPT

## 2025-05-12 ENCOUNTER — TREATMENT (OUTPATIENT)
Dept: CARDIAC REHAB | Facility: HOSPITAL | Age: 60
End: 2025-05-12
Payer: COMMERCIAL

## 2025-05-12 DIAGNOSIS — Z98.61 CAD S/P PERCUTANEOUS CORONARY ANGIOPLASTY: Primary | ICD-10-CM

## 2025-05-12 DIAGNOSIS — I25.10 CAD S/P PERCUTANEOUS CORONARY ANGIOPLASTY: Primary | ICD-10-CM

## 2025-05-12 PROCEDURE — 93798 PHYS/QHP OP CAR RHAB W/ECG: CPT

## 2025-05-13 RX ORDER — METOPROLOL SUCCINATE 25 MG/1
25 TABLET, EXTENDED RELEASE ORAL DAILY
Qty: 90 TABLET | Refills: 3 | Status: SHIPPED | OUTPATIENT
Start: 2025-05-13

## 2025-05-13 RX ORDER — METOPROLOL TARTRATE 25 MG/1
25 TABLET, FILM COATED ORAL 2 TIMES DAILY
Qty: 180 TABLET | Refills: 5 | OUTPATIENT
Start: 2025-05-13

## 2025-05-14 ENCOUNTER — TREATMENT (OUTPATIENT)
Dept: CARDIAC REHAB | Facility: HOSPITAL | Age: 60
End: 2025-05-14
Payer: COMMERCIAL

## 2025-05-14 DIAGNOSIS — I25.10 CAD S/P PERCUTANEOUS CORONARY ANGIOPLASTY: Primary | ICD-10-CM

## 2025-05-14 DIAGNOSIS — Z98.61 CAD S/P PERCUTANEOUS CORONARY ANGIOPLASTY: Primary | ICD-10-CM

## 2025-05-14 PROCEDURE — 93798 PHYS/QHP OP CAR RHAB W/ECG: CPT

## 2025-05-16 ENCOUNTER — TREATMENT (OUTPATIENT)
Dept: CARDIAC REHAB | Facility: HOSPITAL | Age: 60
End: 2025-05-16
Payer: COMMERCIAL

## 2025-05-16 DIAGNOSIS — I25.10 CAD S/P PERCUTANEOUS CORONARY ANGIOPLASTY: Primary | ICD-10-CM

## 2025-05-16 DIAGNOSIS — Z98.61 CAD S/P PERCUTANEOUS CORONARY ANGIOPLASTY: Primary | ICD-10-CM

## 2025-05-16 PROCEDURE — 93798 PHYS/QHP OP CAR RHAB W/ECG: CPT

## 2025-05-19 ENCOUNTER — TREATMENT (OUTPATIENT)
Dept: CARDIAC REHAB | Facility: HOSPITAL | Age: 60
End: 2025-05-19
Payer: COMMERCIAL

## 2025-05-19 DIAGNOSIS — Z98.61 CAD S/P PERCUTANEOUS CORONARY ANGIOPLASTY: Primary | ICD-10-CM

## 2025-05-19 DIAGNOSIS — I25.10 CAD S/P PERCUTANEOUS CORONARY ANGIOPLASTY: Primary | ICD-10-CM

## 2025-05-19 PROCEDURE — 93798 PHYS/QHP OP CAR RHAB W/ECG: CPT

## 2025-05-21 ENCOUNTER — TREATMENT (OUTPATIENT)
Dept: CARDIAC REHAB | Facility: HOSPITAL | Age: 60
End: 2025-05-21
Payer: COMMERCIAL

## 2025-05-21 DIAGNOSIS — I25.10 CAD S/P PERCUTANEOUS CORONARY ANGIOPLASTY: Primary | ICD-10-CM

## 2025-05-21 DIAGNOSIS — Z98.61 CAD S/P PERCUTANEOUS CORONARY ANGIOPLASTY: Primary | ICD-10-CM

## 2025-05-21 PROCEDURE — 93798 PHYS/QHP OP CAR RHAB W/ECG: CPT

## 2025-05-23 ENCOUNTER — TREATMENT (OUTPATIENT)
Dept: CARDIAC REHAB | Facility: HOSPITAL | Age: 60
End: 2025-05-23
Payer: COMMERCIAL

## 2025-05-23 DIAGNOSIS — I25.10 CAD S/P PERCUTANEOUS CORONARY ANGIOPLASTY: Primary | ICD-10-CM

## 2025-05-23 DIAGNOSIS — Z98.61 CAD S/P PERCUTANEOUS CORONARY ANGIOPLASTY: Primary | ICD-10-CM

## 2025-05-23 PROCEDURE — 93798 PHYS/QHP OP CAR RHAB W/ECG: CPT

## 2025-05-28 ENCOUNTER — APPOINTMENT (OUTPATIENT)
Dept: CARDIAC REHAB | Facility: HOSPITAL | Age: 60
End: 2025-05-28
Payer: COMMERCIAL

## 2025-05-30 ENCOUNTER — TREATMENT (OUTPATIENT)
Dept: CARDIAC REHAB | Facility: HOSPITAL | Age: 60
End: 2025-05-30
Payer: COMMERCIAL

## 2025-05-30 DIAGNOSIS — Z98.61 CAD S/P PERCUTANEOUS CORONARY ANGIOPLASTY: Primary | ICD-10-CM

## 2025-05-30 DIAGNOSIS — I25.10 CAD S/P PERCUTANEOUS CORONARY ANGIOPLASTY: Primary | ICD-10-CM

## 2025-05-30 PROCEDURE — 93798 PHYS/QHP OP CAR RHAB W/ECG: CPT

## 2025-06-02 ENCOUNTER — TREATMENT (OUTPATIENT)
Dept: CARDIAC REHAB | Facility: HOSPITAL | Age: 60
End: 2025-06-02
Payer: COMMERCIAL

## 2025-06-02 DIAGNOSIS — I25.10 CAD S/P PERCUTANEOUS CORONARY ANGIOPLASTY: Primary | ICD-10-CM

## 2025-06-02 DIAGNOSIS — Z98.61 CAD S/P PERCUTANEOUS CORONARY ANGIOPLASTY: Primary | ICD-10-CM

## 2025-06-02 PROCEDURE — 93798 PHYS/QHP OP CAR RHAB W/ECG: CPT

## 2025-06-04 ENCOUNTER — APPOINTMENT (OUTPATIENT)
Dept: CARDIAC REHAB | Facility: HOSPITAL | Age: 60
End: 2025-06-04
Payer: COMMERCIAL

## 2025-06-06 ENCOUNTER — TREATMENT (OUTPATIENT)
Dept: CARDIAC REHAB | Facility: HOSPITAL | Age: 60
End: 2025-06-06
Payer: COMMERCIAL

## 2025-06-06 DIAGNOSIS — Z98.61 CAD S/P PERCUTANEOUS CORONARY ANGIOPLASTY: Primary | ICD-10-CM

## 2025-06-06 DIAGNOSIS — I25.10 CAD S/P PERCUTANEOUS CORONARY ANGIOPLASTY: Primary | ICD-10-CM

## 2025-06-06 PROCEDURE — 93798 PHYS/QHP OP CAR RHAB W/ECG: CPT

## 2025-06-09 ENCOUNTER — TREATMENT (OUTPATIENT)
Dept: CARDIAC REHAB | Facility: HOSPITAL | Age: 60
End: 2025-06-09
Payer: COMMERCIAL

## 2025-06-09 DIAGNOSIS — I25.10 CAD S/P PERCUTANEOUS CORONARY ANGIOPLASTY: Primary | ICD-10-CM

## 2025-06-09 DIAGNOSIS — Z98.61 CAD S/P PERCUTANEOUS CORONARY ANGIOPLASTY: Primary | ICD-10-CM

## 2025-06-09 PROCEDURE — 93798 PHYS/QHP OP CAR RHAB W/ECG: CPT

## 2025-06-11 ENCOUNTER — TREATMENT (OUTPATIENT)
Dept: CARDIAC REHAB | Facility: HOSPITAL | Age: 60
End: 2025-06-11
Payer: COMMERCIAL

## 2025-06-11 DIAGNOSIS — Z98.61 CAD S/P PERCUTANEOUS CORONARY ANGIOPLASTY: Primary | ICD-10-CM

## 2025-06-11 DIAGNOSIS — I25.10 CAD S/P PERCUTANEOUS CORONARY ANGIOPLASTY: Primary | ICD-10-CM

## 2025-06-11 PROCEDURE — 93798 PHYS/QHP OP CAR RHAB W/ECG: CPT

## 2025-06-13 ENCOUNTER — APPOINTMENT (OUTPATIENT)
Dept: CARDIAC REHAB | Facility: HOSPITAL | Age: 60
End: 2025-06-13
Payer: COMMERCIAL

## 2025-06-16 ENCOUNTER — APPOINTMENT (OUTPATIENT)
Dept: CARDIAC REHAB | Facility: HOSPITAL | Age: 60
End: 2025-06-16
Payer: COMMERCIAL

## 2025-06-18 ENCOUNTER — APPOINTMENT (OUTPATIENT)
Dept: CARDIAC REHAB | Facility: HOSPITAL | Age: 60
End: 2025-06-18
Payer: COMMERCIAL

## 2025-06-20 ENCOUNTER — APPOINTMENT (OUTPATIENT)
Dept: CARDIAC REHAB | Facility: HOSPITAL | Age: 60
End: 2025-06-20
Payer: COMMERCIAL

## 2025-06-23 ENCOUNTER — APPOINTMENT (OUTPATIENT)
Dept: CARDIAC REHAB | Facility: HOSPITAL | Age: 60
End: 2025-06-23
Payer: COMMERCIAL

## 2025-06-25 ENCOUNTER — APPOINTMENT (OUTPATIENT)
Dept: CARDIAC REHAB | Facility: HOSPITAL | Age: 60
End: 2025-06-25
Payer: COMMERCIAL

## 2025-06-27 ENCOUNTER — APPOINTMENT (OUTPATIENT)
Dept: CARDIAC REHAB | Facility: HOSPITAL | Age: 60
End: 2025-06-27
Payer: COMMERCIAL

## 2025-06-30 ENCOUNTER — APPOINTMENT (OUTPATIENT)
Dept: CARDIAC REHAB | Facility: HOSPITAL | Age: 60
End: 2025-06-30
Payer: COMMERCIAL

## (undated) DEVICE — CATH DIAG IMPULSE FR4 5F 100CM

## (undated) DEVICE — DGW .035 FC J3MM 260CM TEF: Brand: EMERALD

## (undated) DEVICE — GLIDESHEATH SLENDER STAINLESS STEEL KIT: Brand: GLIDESHEATH SLENDER

## (undated) DEVICE — CATH DIAG IMPULSE FL3.5 5F 100CM

## (undated) DEVICE — PK CATH CARD 40

## (undated) DEVICE — KT MANIFLD CARDIAC

## (undated) DEVICE — HI-TORQUE WHISPER ES GUIDE WIRE .014 STRAIGHTTIP 3.0 CM X 190 CM: Brand: HI-TORQUE WHISPER

## (undated) DEVICE — DEV INDEFLATOR P/N 580289

## (undated) DEVICE — 6F .070 JR 4 100CM: Brand: CORDIS

## (undated) DEVICE — RUNTHROUGH NS EXTRA FLOPPY PTCA GUIDEWIRE: Brand: RUNTHROUGH

## (undated) DEVICE — TR BAND RADIAL ARTERY COMPRESSION DEVICE: Brand: TR BAND